# Patient Record
Sex: FEMALE | Race: BLACK OR AFRICAN AMERICAN | Employment: OTHER | URBAN - METROPOLITAN AREA
[De-identification: names, ages, dates, MRNs, and addresses within clinical notes are randomized per-mention and may not be internally consistent; named-entity substitution may affect disease eponyms.]

---

## 2017-01-03 ENCOUNTER — HOSPITAL ENCOUNTER (OUTPATIENT)
Dept: PHYSICAL THERAPY | Age: 77
Discharge: HOME | End: 2017-01-03
Payer: MEDICARE

## 2017-01-03 PROCEDURE — 97112 NEUROMUSCULAR REEDUCATION: CPT

## 2017-01-03 PROCEDURE — G8982 BODY POS GOAL STATUS: HCPCS

## 2017-01-03 PROCEDURE — 97116 GAIT TRAINING THERAPY: CPT

## 2017-01-03 PROCEDURE — G8981 BODY POS CURRENT STATUS: HCPCS

## 2017-01-03 PROCEDURE — 97140 MANUAL THERAPY 1/> REGIONS: CPT

## 2017-01-03 NOTE — PROGRESS NOTES
[facility]              [address]        [place], [address]   [phone] fax    Medicare Progress Report    Patient name: Heather Villalpando Start of Care: 2016   Referral source: Chan Szymanski MD : 1940   Medical/Treatment Diagnosis: Unsteadiness on feet [R26.81]  Dizziness and giddiness [R42] Onset Date:~1 year ago     Prior Hospitalization: see medical history Provider#: [id number]   Medications: Verified on Patient Summary List    Comorbidities: Allergies, Arthritis, Asthma, Back pain, CHF, Depression, HA's, HTN, Prior sx  Prior Level of Function: Pt is (I) with all ADL's, lives alone. Visits from Start of Care: 9    Missed Visits: 0    Reporting Period: 2016 to 1/3/2017    Subjective Reports: Pt reports 60% improvement since [facility]    Key functional changes:   Progress towards goals / Updated goals:  Short term goals: to be accomplished in 2 weeks  1) Pt will report (I) and compliance with HEP for home management of symptoms.    At eval: Instructed, demonstrated, and performed HEP  Current: met, per pt report   2) Pt will improve Rhomberg EO for 30 sec w/o sway to R to decrease risk for falls. At eval: Rhomberg EO sway to R  Current: Met, no sway or touch down today   Long term goals: to be accomplished in 6 weeks  1) Pt will improve FOTO score > or = 65 indicating improvements in function. At eval: FOTO = 59  Current: Goal met- FOTO = 71 (+12 since I.E.)   2) Pt will improve MSR B > or = 30 sec w/o LOB to decrease risk for falls. At eval: MSR LOB after 4 sec  Current: Goal met- B MSR 30 sec w/o LOB   3) Pt will improve Rhomberg EC > or = 15 sec to decrease risk for falls. At eval: LOB after 5 sec with sway to R. Current: Goal met, pt holds 30\" without LOB. 4) Pt will improve C/S AROM all planes WFL w/o an increase in pain/pull for ease with ADL's and driving.   At eval: flex 20 deg with pain, ext 35 deg with pain, B SB 18 deg pull on opposite side of bend, B rot 50 deg with pain on R to L  Current: Progressing, flex 28 (+8), ext 38 (+3), R SB 18 slight pain (no change), L SB 19 (+1) with slight pain, R rot 58 (+8) with pain, L rot 50 (no change). 5) Pt will report > or = 60% improvement in symptoms in order to progress rehab. At eval: 0%  Current: Goal met- pt reports 60% improvement      Pt has progressed well with PT. Pt has demonstrated improvement in standing balance act's, C/S AROM, and overall function. Assessment / Recommendations: Patient will continue to benefit from skilled PT services to modify and progress therapeutic interventions, address functional mobility deficits, address ROM deficits, address strength deficits, analyze and address soft tissue restrictions, analyze and cue movement patterns, assess and modify postural abnormalities, address imbalance/dizziness and instruct in home and community integration to attain remaining goals. Problem List: pain affecting function, decrease ROM, decrease strength, impaired gait/ balance, decrease ADL/ functional abilitiies, decrease activity tolerance and decrease flexibility/ joint mobility   Treatment Plan: Therapeutic exercise, Therapeutic activities, Neuromuscular re-education, Physical agent/modality, Gait/balance training, Manual therapy and Patient education    Updated Goals to be accomplished in 3 weeks: Continue with above unmet goals  1) Pt will demonstrate B tandem stance > or = 30 sec w/o LOB to decrease risk for falls. At PN: LOB after ~10 sec B  2) Pt will report > or = 75% improvement in symptoms in order to progress rehab. At PN: pt reports 60% improvement    Frequency / Duration: Patient to be seen 2 times per week for 3 weeks:    G-Codes (GP)  Position  [id number] Current  CJ= 20-39%   Goal  CJ= 20-39%    The severity rating is based on clinical judgment and the FOTO score.       Jonathan Ag, PT 1/3/2017 10:29 AM

## 2017-01-03 NOTE — PROGRESS NOTES
PT DAILY TREATMENT NOTE - [facility]     Patient Name: Jerry Chavez  Date:1/3/2017  : 1940  [x]  Patient  Verified  [place]or: [place] / Plan: [place] MED[place]ARE PART A & B / Product Type: Medicare /    In time:9:30  Out time:10:20  Total Treatment Time (min): 50  Total Timed Codes (min): 40  1:1 Treatment Time (MC only): 40   Visit #: 9 of 12    Treatment Area: Unsteadiness on feet [R26.81]  Dizziness and giddiness [R42]    SUBJECTIVE  Pain Level (0-10 scale): N-1, D-0  Any medication changes, allergies to medications, adverse drug reactions, diagnosis change, or new procedure performed?: [x] No    [] Yes (see summary sheet for update)  Subjective functional status/changes:   [] No changes reported  Pt reports no current dizziness and minimal neck pain.      OBJECTIVE    Modality rationale: decrease pain and increase tissue extensibility to improve the patients ability to perform ADL's   Min Type Additional Details    [] Estim:  []Unatt       []IFC  []Premod                        []Other:  []w/ice   []w/heat  Position:  Location:    [] Estim: []Att    []TENS instruct  []NMES                    []Other:  []w/US   []w/ice   []w/heat  Position:  Location:    []  Traction: [] Cervical       []Lumbar                       [] Prone          []Supine                       []Intermittent   []Continuous Lbs:  [] before manual  [] after manual    []  Ultrasound: []Continuous   [] Pulsed                           []1MHz   []3MHz W/cm2:  Location:    []  Iontophoresis with dexamethasone         Location: [] Take home patch   [] In clinic   10 []  Ice     [x]  heat  []  Ice massage  []  Laser   []  Anodyne Position: supine  Location: neck    []  Laser with stim  []  Other:  Position:  Location:    []  Vasopneumatic Device Pressure:       [] lo [] med [] hi   Temperature: [] lo [] med [] hi   [] Skin assessment post-treatment:  []intact []redness- no adverse reaction    []redness  adverse reaction:      min []Eval []Re-Eval        min Therapeutic Exercise:  [] See flow sheet :   Rationale:  to improve the patients ability to      min Therapeutic Activity:  []  See flow sheet :   Rationale:   to improve the patients ability to      20 min Neuromuscular Re-education:  [x]  See flow sheet : vestibular ex's, balance ex's   Rationale: improve coordination, improve balance and increase proprioception  to improve the patients ability to decrease risk for falls    10 min Manual Therapy:  Per flow sheet   Rationale: decrease pain, increase ROM, increase tissue extensibility and decrease trigger points to increase ease with ADL's    10 min Gait Training:  Dynamic gait per flow sheet   Rationale: to improve          With   [] TE   [] TA   [] neuro   [] other: Patient Education: [x] Review HEP    [] Progressed/Changed HEP based on:   [] positioning   [] body mechanics   [] transfers   [] heat/ice application    [] other:      Other Objective/Functional Measures: see goals below     Pain Level (0-10 scale) post treatment: N-1, D-0    ASSESSMENT/Changes in Function: cont per POC    Patient will continue to benefit from skilled PT services to modify and progress therapeutic interventions, address functional mobility deficits, address ROM deficits, address strength deficits, analyze and address soft tissue restrictions, analyze and cue movement patterns, assess and modify postural abnormalities, address imbalance/dizziness and instruct in home and community integration to attain remaining goals.      []  See Plan of Care  []  See progress note/recertification  []  See Discharge Summary         Progress towards goals / Updated goals:  Short term goals: to be accomplished in 2 weeks  1) Pt will report (I) and compliance with HEP for home management of symptoms.    At eval: Instructed, demonstrated, and performed HEP  Current: met, per pt report 12/15/16  2) Pt will improve Rhomberg EO for 30 sec w/o sway to R to decrease risk for falls. At eval: Rhomberg EO sway to R  Current: Met, no sway or touch down today 12/15/16  Long term goals: to be accomplished in 6 weeks  1) Pt will improve FOTO score > or = 65 indicating improvements in function. At eval: FOTO = 59  Current: Goal met- FOTO = 71 (+12 since I.E.) (12/20/16)  2) Pt will improve MSR B > or = 30 sec w/o LOB to decrease risk for falls. At eval: MSR LOB after 4 sec  Current: Goal met- B MSR 30 sec w/o LOB (1/3/17)  3) Pt will improve Rhomberg EC > or = 15 sec to decrease risk for falls. At eval: LOB after 5 sec with sway to R. Current: Goal met, pt holds 30\" without LOB. 12/28/16   4) Pt will improve C/S AROM all planes WFL w/o an increase in pain/pull for ease with ADL's and driving. At eval: flex 20 deg with pain, ext 35 deg with pain, B SB 18 deg pull on opposite side of bend, B rot 50 deg with pain on R to L  Current: Progressing, flex 28 (+8), ext 38 (+3), R SB 18 slight pain (no change), L SB 19 (+1) with slight pain, R rot 58 (+8) with pain, L rot 50 (no change). 12/28/16  5) Pt will report > or = 60% improvement in symptoms in order to progress rehab.   At eval: 0%  Current: Goal met- pt reports 60% improvement (1/3/17)    PLAN  [x]  Upgrade activities as tolerated     [x]  Continue plan of care  []  Update interventions per flow sheet       []  Discharge due to:_  []  Other:_      Unique Parks PT 1/3/2017  9:22 AM    Future Appointments  Date Time Provider Alva Pierre   1/3/2017 9:30 AM Unique Parks PT [facility]   1/5/2017 9:30 AM Unique Parks PT [facility]

## 2017-01-05 ENCOUNTER — HOSPITAL ENCOUNTER (OUTPATIENT)
Dept: PHYSICAL THERAPY | Age: 77
Discharge: HOME | End: 2017-01-05
Payer: MEDICARE

## 2017-01-05 PROCEDURE — 97116 GAIT TRAINING THERAPY: CPT

## 2017-01-05 PROCEDURE — G8983 BODY POS D/C STATUS: HCPCS

## 2017-01-05 PROCEDURE — G8982 BODY POS GOAL STATUS: HCPCS

## 2017-01-05 PROCEDURE — 97112 NEUROMUSCULAR REEDUCATION: CPT

## 2017-01-05 PROCEDURE — 97140 MANUAL THERAPY 1/> REGIONS: CPT

## 2017-02-07 NOTE — PROGRESS NOTES
[facility]              [address]        [place], [address] [place]   [phone] fax      Discharge Summary  Patient name: Seng Savage Start of Care: 2016   Referral source: Piyush Zeng MD : 1940   Medical/Treatment Diagnosis: Unsteadiness on feet [R26.81]  Dizziness and giddiness [R42] Onset Date:~1 year ago     Prior Hospitalization: see medical history Provider#: [id number]   Medications: Verified on Patient Summary List    Comorbidities: Allergies, Arthritis, Asthma, Back pain, CHF, Depression, HA's, HTN, Prior sx  Prior Level of Function: Pt is (I) with all ADL's, lives alone. Visits from Start of Care: 10    Missed Visits: 0  Reporting Period : 2016 to 2017      Summary of Care:  Progress towards goals / Updated goals:  Short term goals: to be accomplished in 2 weeks  1) Pt will report (I) and compliance with HEP for home management of symptoms.    At eval: Instructed, demonstrated, and performed HEP  Current: met, per pt report   2) Pt will improve Rhomberg EO for 30 sec w/o sway to R to decrease risk for falls. At eval: Rhomberg EO sway to R  Current: Met, no sway or touch down today   Long term goals: to be accomplished in 6 weeks  1) Pt will improve FOTO score > or = 65 indicating improvements in function. At eval: FOTO = 59  Current: Goal met- FOTO = 71 (+12 since I.E.)   2) Pt will improve MSR B > or = 30 sec w/o LOB to decrease risk for falls. At eval: MSR LOB after 4 sec  Current: Goal met- B MSR 30 sec w/o LOB   3) Pt will improve Rhomberg EC > or = 15 sec to decrease risk for falls. At eval: LOB after 5 sec with sway to R. Current: Goal met, pt holds 30\" without LOB. 4) Pt will improve C/S AROM all planes WFL w/o an increase in pain/pull for ease with ADL's and driving.   At eval: flex 20 deg with pain, ext 35 deg with pain, B SB 18 deg pull on opposite side of bend, B rot 50 deg with pain on R to L  Current: Progressing, flex 28 (+8), ext 38 (+3), R SB 18 slight pain (no change), L SB 19 (+1) with slight pain, R rot 58 (+8) with pain, L rot 50 (no change). 5) Pt will report > or = 60% improvement in symptoms in order to progress rehab. At eval: 0%  Current: Goal met- pt reports 60% improvement       Pt has progressed well with PT. Pt has demonstrated improvement in standing balance act's, C/S AROM, and overall function. Pt was put on hold pending symptoms w/o PT and we never heard back from pt so D/C at this time due to progress toward goals and 30 day policy. G-Codes (GP)  Position   Goal  CJ= 20-39%  [id number] D/C  CJ= 20-39%    The severity rating is based on clinical judgment and the FOTO Score score.     ASSESSMENT/RECOMMENDATIONS:  [x]Discontinue therapy: [x]Patient has reached or is progressing toward set goals      []Patient is non-compliant or has abdicated      []Due to lack of appreciable progress towards set 1324 Reji Pinedo, PT 2/7/2017 2:30 PM

## 2019-01-30 ENCOUNTER — ANESTHESIA EVENT (OUTPATIENT)
Dept: ENDOSCOPY | Age: 79
End: 2019-01-30
Payer: MEDICARE

## 2019-01-30 ENCOUNTER — HOSPITAL ENCOUNTER (OUTPATIENT)
Age: 79
Setting detail: OUTPATIENT SURGERY
Discharge: HOME | End: 2019-01-30
Attending: INTERNAL MEDICINE | Admitting: INTERNAL MEDICINE
Payer: MEDICARE

## 2019-01-30 ENCOUNTER — ANESTHESIA (OUTPATIENT)
Dept: ENDOSCOPY | Age: 79
End: 2019-01-30
Payer: MEDICARE

## 2019-01-30 VITALS
DIASTOLIC BLOOD PRESSURE: 80 MMHG | BODY MASS INDEX: 24.04 KG/M2 | HEIGHT: 64 IN | SYSTOLIC BLOOD PRESSURE: 138 MMHG | TEMPERATURE: 97.6 F | OXYGEN SATURATION: 100 % | HEART RATE: 64 BPM | RESPIRATION RATE: 14 BRPM | WEIGHT: 140.8 LBS

## 2019-01-30 LAB
BUN BLD-MCNC: 10 MG/DL (ref 7–18)
CHLORIDE BLD-SCNC: 102 MMOL/L (ref 100–108)
GLUCOSE BLD STRIP.AUTO-MCNC: 122 MG/DL (ref 74–106)
HCT VFR BLD CALC: 44 % (ref 36–49)
HGB BLD-MCNC: 15 G/DL (ref 12–16)
POTASSIUM BLD-SCNC: 4.1 MMOL/L (ref 3.5–5.5)
SODIUM BLD-SCNC: 139 MMOL/L (ref 136–145)

## 2019-01-30 PROCEDURE — 76040000019: Performed by: INTERNAL MEDICINE

## 2019-01-30 PROCEDURE — 82947 ASSAY GLUCOSE BLOOD QUANT: CPT

## 2019-01-30 PROCEDURE — 77030018846 HC SOL IRR STRL H20 ICUM -A: Performed by: INTERNAL MEDICINE

## 2019-01-30 PROCEDURE — 77030013992 HC SNR POLYP ENDOSC BSC -B: Performed by: INTERNAL MEDICINE

## 2019-01-30 PROCEDURE — 77030009426 HC FCPS BIOP ENDOSC BSC -B: Performed by: INTERNAL MEDICINE

## 2019-01-30 PROCEDURE — 77030008565 HC TBNG SUC IRR ERBE -B: Performed by: INTERNAL MEDICINE

## 2019-01-30 PROCEDURE — 88305 TISSUE EXAM BY PATHOLOGIST: CPT

## 2019-01-30 PROCEDURE — 74011250636 HC RX REV CODE- 250/636

## 2019-01-30 PROCEDURE — 76060000031 HC ANESTHESIA FIRST 0.5 HR: Performed by: INTERNAL MEDICINE

## 2019-01-30 PROCEDURE — 74011250636 HC RX REV CODE- 250/636: Performed by: INTERNAL MEDICINE

## 2019-01-30 RX ORDER — MAGNESIUM SULFATE 100 %
4 CRYSTALS MISCELLANEOUS AS NEEDED
Status: DISCONTINUED | OUTPATIENT
Start: 2019-01-30 | End: 2019-01-30 | Stop reason: HOSPADM

## 2019-01-30 RX ORDER — SODIUM CHLORIDE 0.9 % (FLUSH) 0.9 %
5-40 SYRINGE (ML) INJECTION AS NEEDED
Status: DISCONTINUED | OUTPATIENT
Start: 2019-01-30 | End: 2019-01-30 | Stop reason: HOSPADM

## 2019-01-30 RX ORDER — ONDANSETRON 2 MG/ML
4 INJECTION INTRAMUSCULAR; INTRAVENOUS ONCE
Status: DISCONTINUED | OUTPATIENT
Start: 2019-01-30 | End: 2019-01-30 | Stop reason: HOSPADM

## 2019-01-30 RX ORDER — DEXTROSE 50 % IN WATER (D50W) INTRAVENOUS SYRINGE
25-50 AS NEEDED
Status: DISCONTINUED | OUTPATIENT
Start: 2019-01-30 | End: 2019-01-30 | Stop reason: HOSPADM

## 2019-01-30 RX ORDER — LIDOCAINE HYDROCHLORIDE 20 MG/ML
INJECTION, SOLUTION EPIDURAL; INFILTRATION; INTRACAUDAL; PERINEURAL AS NEEDED
Status: DISCONTINUED | OUTPATIENT
Start: 2019-01-30 | End: 2019-01-30 | Stop reason: HOSPADM

## 2019-01-30 RX ORDER — SODIUM CHLORIDE 0.9 % (FLUSH) 0.9 %
5-40 SYRINGE (ML) INJECTION EVERY 8 HOURS
Status: DISCONTINUED | OUTPATIENT
Start: 2019-01-30 | End: 2019-01-30 | Stop reason: HOSPADM

## 2019-01-30 RX ORDER — SODIUM CHLORIDE, SODIUM LACTATE, POTASSIUM CHLORIDE, CALCIUM CHLORIDE 600; 310; 30; 20 MG/100ML; MG/100ML; MG/100ML; MG/100ML
125 INJECTION, SOLUTION INTRAVENOUS CONTINUOUS
Status: DISCONTINUED | OUTPATIENT
Start: 2019-01-30 | End: 2019-01-30 | Stop reason: HOSPADM

## 2019-01-30 RX ORDER — PROPOFOL 10 MG/ML
INJECTION, EMULSION INTRAVENOUS AS NEEDED
Status: DISCONTINUED | OUTPATIENT
Start: 2019-01-30 | End: 2019-01-30 | Stop reason: HOSPADM

## 2019-01-30 RX ORDER — SODIUM CHLORIDE, SODIUM LACTATE, POTASSIUM CHLORIDE, CALCIUM CHLORIDE 600; 310; 30; 20 MG/100ML; MG/100ML; MG/100ML; MG/100ML
75 INJECTION, SOLUTION INTRAVENOUS CONTINUOUS
Status: DISCONTINUED | OUTPATIENT
Start: 2019-01-30 | End: 2019-01-30 | Stop reason: HOSPADM

## 2019-01-30 RX ORDER — FENTANYL CITRATE 50 UG/ML
50 INJECTION, SOLUTION INTRAMUSCULAR; INTRAVENOUS AS NEEDED
Status: DISCONTINUED | OUTPATIENT
Start: 2019-01-30 | End: 2019-01-30 | Stop reason: HOSPADM

## 2019-01-30 RX ADMIN — PROPOFOL 50 MG: 10 INJECTION, EMULSION INTRAVENOUS at 11:32

## 2019-01-30 RX ADMIN — PROPOFOL 30 MG: 10 INJECTION, EMULSION INTRAVENOUS at 11:38

## 2019-01-30 RX ADMIN — PROPOFOL 40 MG: 10 INJECTION, EMULSION INTRAVENOUS at 11:42

## 2019-01-30 RX ADMIN — LIDOCAINE HYDROCHLORIDE 40 MG: 20 INJECTION, SOLUTION EPIDURAL; INFILTRATION; INTRACAUDAL; PERINEURAL at 11:29

## 2019-01-30 RX ADMIN — SODIUM CHLORIDE, SODIUM LACTATE, POTASSIUM CHLORIDE, AND CALCIUM CHLORIDE 125 ML/HR: 600; 310; 30; 20 INJECTION, SOLUTION INTRAVENOUS at 10:34

## 2019-01-30 NOTE — PERIOP NOTES
Patient confirmed by two identifiers with discharge instructions prior too being provided to patient and son. Patient armband removed and shredded

## 2019-01-30 NOTE — ANESTHESIA POSTPROCEDURE EVALUATION
Procedure(s): 
COLONOSCOPY w polypectomy. Anesthesia Post Evaluation Multimodal analgesia: multimodal analgesia used between 6 hours prior to anesthesia start to PACU discharge Patient location during evaluation: bedside Patient participation: complete - patient participated Level of consciousness: awake Pain management: adequate Airway patency: patent Anesthetic complications: no 
Cardiovascular status: stable Respiratory status: acceptable Hydration status: acceptable Post anesthesia nausea and vomiting:  controlled Visit Vitals /80 (BP 1 Location: Right arm, BP Patient Position: At rest) Pulse 64 Temp 36.4 °C (97.6 °F) Resp 14 Ht 5' 4\" (1.626 m) Wt 63.9 kg (140 lb 12.8 oz) SpO2 100% BMI 24.17 kg/m²

## 2019-01-30 NOTE — PROCEDURES
[facility]  [address], [address]      Brief Procedure Note    Aj Cesar  1940  [id number]    Date of Procedure: 1/30/2019    Preoperative diagnosis: dx    Postoperative diagnosis:  ascending polyp, hemorrhoids    Type of Anesthesia: MAC (monitered anesthesia care)    Description of Findings: same as post op dx    Procedure: Procedure(s):  COLONOSCOPY w polypectomy    :  Dr. Sridhar Garcia MD    Assistant(s): @[facility]@    Type of Anesthesia:MAC     EBL:None    Specimens:   ID Type Source Tests Collected by Time Destination   1 : Ascending polyp Preservative Colon  Jack Worthington MD 1/30/2019 1139 Pathology       Findings: See printed and scanned procedure note    Complications: None    Dr. Sridhar Garcia MD  1/30/2019  11:48 AM

## 2019-01-30 NOTE — H&P
Chief Complaint: CRCS History of present illness: No major complaints. PMH:  
Past Medical History:  
Diagnosis Date  AAA (abdominal aortic aneurysm) ([facility] [address]  Adrenal cyst ([address]  Arthritis  Asthma  Degenerative joint disease (DJD) of lumbar spine  DJD (degenerative joint disease) of cervical spine  BORIS (generalized anxiety disorder)  GERD (gastroesophageal reflux disease)  HLD (hyperlipidemia)  Hypertension  Kidney lesion, native, left  Leaking abdominal aortic aneurysm (AAA) ([facility] [address]  Liver cyst   
 Meniere disease, left  Pancreatic cyst   
 Renal cyst   
 Solitary pulmonary nodule Allergies: Allergies Allergen Reactions  Morphine Itching and Hives Medications:  
Current Facility-Administered Medications:  
  lactated Ringers infusion, 125 mL/hr, IntraVENous, CONTINUOUS, Alex Dean MD, Last Rate: 125 mL/hr at 19 1034, 125 mL/hr at 19 1034 FH:  
Family History Problem Relation Age of Onset  Thyroid Disease Mother  Heart Disease Mother  Lung Cancer Father  Cancer Sister  Lung Cancer Brother Social:  
Social History Socioeconomic History  Marital status:  Spouse name: Not on file  Number of children: Not on file  Years of education: Not on file  Highest education level: Not on file Tobacco Use  Smoking status: Former Smoker Last attempt to quit:  Years since quittin.0  Smokeless tobacco: Never Used Substance and Sexual Activity  Alcohol use: No  
 Drug use: No  
 
Surgical H:  
Past Surgical History:  
Procedure Laterality Date  CARDIAC SURG PROCEDURE UNLIST    
 aaa repair  HX APPENDECTOMY  HX CERVICAL DISKECTOMY  HX CHOLECYSTECTOMY  HX GYN    
 hysterectomy  HX LUMBAR DISKECTOMY  HX ORTHOPAEDIC    
 neck  HX ORTHOPAEDIC    
 back  HX OTHER SURGICAL    
 lypoma ROS: negative Physical Exam: Visit Vitals /79 Pulse 67 Temp 97.5 °F (36.4 °C) Resp 18 Ht 5' 4\" (1.626 m) Wt 63.9 kg (140 lb 12.8 oz) SpO2 94% BMI 24.17 kg/m² General appearance: alert, no distress Eyes: pupils equal and reactive, extraocular eye movements intact Nodes: No gross adenopathy in neck. Skin: no spider angiomata, jaundice, palmar erythema Respiratory: clear to auscultation bilaterally Cardiovascular: regular heart rate, no murmurs, no JVD, normal rate and regular rhythm Abdomen: soft, non-tender, liver not enlarged, spleen not palpable, no obvious ascites Extremities: no muscle wasting, no gross arthritic changes Neurologic: alert and oriented, cranial nerves grossly intact, no asterixis Labs: No results found for this or any previous visit (from the past 24 hour(s)). Imp/ Plan: Will proceed with colonoscopy as planned. Risk benefits alternative including but not limited to infection, bleeding, perforation of viscous, allergic reaction and resultant morbidity and mortality was discussed. Chance of missing a significant lesion due to various reasons were discussed. Stephanie Vides MD 
[facility], [place]

## 2019-01-30 NOTE — ANESTHESIA PREPROCEDURE EVALUATION
Anesthetic History No history of anesthetic complications Review of Systems / Medical History Patient summary reviewed and pertinent labs reviewed Pulmonary Asthma : well controlled Neuro/Psych Within defined limits Cardiovascular Hypertension: well controlled GI/Hepatic/Renal 
  
GERD: well controlled Liver disease Endo/Other Arthritis Other Findings Physical Exam 
 
Airway Mallampati: II 
TM Distance: 4 - 6 cm Neck ROM: normal range of motion Mouth opening: Normal 
 
 Cardiovascular Dental 
No notable dental hx Pulmonary Abdominal 
GI exam deferred Other Findings Anesthetic Plan ASA: 3 Anesthesia type: MAC Anesthetic plan and risks discussed with: Patient

## 2019-07-11 ENCOUNTER — OFFICE VISIT (OUTPATIENT)
Dept: ORTHOPEDIC SURGERY | Age: 79
End: 2019-07-11

## 2019-07-11 ENCOUNTER — DOCUMENTATION ONLY (OUTPATIENT)
Dept: ORTHOPEDIC SURGERY | Age: 79
End: 2019-07-11

## 2019-07-11 VITALS
TEMPERATURE: 98.3 F | HEART RATE: 73 BPM | SYSTOLIC BLOOD PRESSURE: 143 MMHG | OXYGEN SATURATION: 99 % | HEIGHT: 64 IN | DIASTOLIC BLOOD PRESSURE: 93 MMHG | BODY MASS INDEX: 24.11 KG/M2 | WEIGHT: 141.2 LBS

## 2019-07-11 DIAGNOSIS — M50.30 DDD (DEGENERATIVE DISC DISEASE), CERVICAL: ICD-10-CM

## 2019-07-11 DIAGNOSIS — R03.0 ELEVATED BLOOD PRESSURE READING: ICD-10-CM

## 2019-07-11 DIAGNOSIS — M54.5 LOW BACK PAIN, UNSPECIFIED BACK PAIN LATERALITY, UNSPECIFIED CHRONICITY, WITH SCIATICA PRESENCE UNSPECIFIED: Primary | ICD-10-CM

## 2019-07-11 DIAGNOSIS — R29.898 LEFT LEG WEAKNESS: ICD-10-CM

## 2019-07-11 DIAGNOSIS — M47.816 LUMBAR FACET ARTHROPATHY: ICD-10-CM

## 2019-07-11 DIAGNOSIS — M62.838 MUSCLE SPASM: ICD-10-CM

## 2019-07-11 DIAGNOSIS — M54.2 NECK PAIN: ICD-10-CM

## 2019-07-11 DIAGNOSIS — Z86.79 STATUS POST REPAIR OF ABDOMINAL AORTIC ANEURYSM (AAA) USING BIFURCATION GRAFT: ICD-10-CM

## 2019-07-11 DIAGNOSIS — M47.812 CERVICAL FACET JOINT SYNDROME: ICD-10-CM

## 2019-07-11 DIAGNOSIS — M48.062 SPINAL STENOSIS OF LUMBAR REGION WITH NEUROGENIC CLAUDICATION: ICD-10-CM

## 2019-07-11 DIAGNOSIS — M79.18 MYOFASCIAL PAIN: ICD-10-CM

## 2019-07-11 DIAGNOSIS — Z95.828 STATUS POST REPAIR OF ABDOMINAL AORTIC ANEURYSM (AAA) USING BIFURCATION GRAFT: ICD-10-CM

## 2019-07-11 NOTE — PROGRESS NOTES
[facility]  [address] [place], [facility] [address]  Phone: [phone]  Fax: [phone]    NEW PATIENT  Pt's YOB: 1940    ASSESSMENT   Diagnoses and all orders for this visit:    1. Low back pain, unspecified back pain laterality, unspecified chronicity, with sciatica presence unspecified  -     XR SPINE LUMB MIN 4 V; Future  -     MRI LUMB SPINE W WO CONT; Future    2. Lumbar facet arthropathy  -     MRI LUMB SPINE W WO CONT; Future    3. Spinal stenosis of lumbar region with neurogenic claudication  -     MRI LUMB SPINE W WO CONT; Future    4. Left leg weakness  -     MRI LUMB SPINE W WO CONT; Future    5. Muscle spasm    6. Neck pain  -     XR SPINE CERV FLEX/EXT MAX 3 V; Future    7. Elevated blood pressure reading    8. DDD (degenerative disc disease), cervical    9. Cervical facet joint syndrome    10. Myofascial pain    11. Status post repair of abdominal aortic aneurysm (AAA) using bifurcation graft         IMPRESSION AND PLAN:  Eb Moss is a 77 y.o. female with history of neck and low back pain x 1 year. She complains of pain in the lower back with pain and weakness in both legs. Pt notes increased pain since starting physical therapy. She also complains of neck pain, numbness in the hands/fingers, weakness in the hands, and notes to difficulty opening jars. Pt has been prescribed Neurontin 300 mg and takes 1 tab QHS. 1) Pt was given information on cervical and lumbar arthritis exercises. 2) A lumbar MRI was ordered. She has lumbar pain with bilateral radicular symptoms, left leg weakness, difficulty walking, and has attended physical therapy. 3) Pt was given information on how to use a TheraCane. 4) She will discontinue physical therapy since she reports increased pain with sessions. 5) Ms. Ericka Jones has a reminder for a \"due or due soon\" health maintenance.  I have asked that she contact her primary care provider, Damaris Smith MD, for follow-up on this health maintenance. 6)  demonstrated consistency with prescribing. Follow-up and Dispositions    · Return in about 2 weeks (around 7/25/2019) for Diagnostic Test follow up. HISTORY OF PRESENT ILLNESS:  Luz Maria Dodge is a 77 y.o. female with history of neck and low back pain x 1 year. Pt presents to the office today as a new patient referred by Damaris Smith MD. She complains of pain in the lower back with pain and weakness in both legs. Pt notes increased pain since starting physical therapy. She states that prior to starting land physical therapy her symptoms were mostly right sided but now she also has left sided symptoms. Pt admits to issues with balance and notes that she occasionally has difficulty picking up her feet. Of note, she had 2 previous lumbar surgeries. Her first surgery was with Dr. Esther Donald and her second surgery was with Dr. Limon. She also complains of neck pain and reports a previous neck surgery with Dr. Freddie Brock. Pt admits to numbness in the hands/fingers, weakness in the hands, and notes to difficulty opening jars. Pt has been prescribed Neurontin 300 mg and takes 1 tab QHS. Pt notes sedation upon waking when she tried taking Neurontin 300 mg 2 tabs QHS. She also takes Tylenol Extra Strength as needed. Of note, she had previous vascular surgery with Dr. Juan Day. 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 Pt at this time desires to proceed with a lumbar MRI. Pt's daughter accompanied the patient today and notes that her mother likes to garden. Her daughter reports that she often spends half a day working in her garden.      Pain Scale: 6/10     PCP: Damaris Smith MD    Past Medical History:   Diagnosis Date    AAA (abdominal aortic aneurysm) ([facility] [address]     Adrenal cyst (HCC)     Arthritis     Asthma     Degenerative joint disease (DJD) of lumbar spine     DJD (degenerative joint disease) of cervical spine     BORIS (generalized anxiety disorder)     GERD (gastroesophageal reflux disease)     HLD (hyperlipidemia)     Hypertension     Kidney lesion, native, left     Leaking abdominal aortic aneurysm (AAA) (HCC)     Liver cyst     Meniere disease, left     Pancreatic cyst     Renal cyst     Solitary pulmonary nodule         Social History     Socioeconomic History    Marital status:      Spouse name: Not on file    Number of children: Not on file    Years of education: Not on file    Highest education level: Not on file   Occupational History    Not on file   Social Needs    Financial resource strain: Not on file    Food insecurity:     Worry: Not on file     Inability: Not on file    Transportation needs:     Medical: Not on file     Non-medical: Not on file   Tobacco Use    Smoking status: Former Smoker     Last attempt to quit:      Years since quittin.5    Smokeless tobacco: Never Used   Substance and Sexual Activity    Alcohol use: No    Drug use: No    Sexual activity: Not on file   Lifestyle    Physical activity:     Days per week: Not on file     Minutes per session: Not on file    Stress: Not on file   Relationships    Social connections:     Talks on phone: Not on file     Gets together: Not on file     Attends Pentecostal service: Not on file     Active member of club or organization: Not on file     Attends meetings of clubs or organizations: Not on file     Relationship status: Not on file    Intimate partner violence:     Fear of current or ex partner: Not on file     Emotionally abused: Not on file     Physically abused: Not on file     Forced sexual activity: Not on file   Other Topics Concern    Not on file   Social History Narrative    Not on file       Current Outpatient Medications   Medication Sig Dispense Refill    folic acid/multivit-min/lutein (CENTRUM SILVER PO) Take 1 Tab by Mouth Once a Day.       loratadine (CLARITIN) 10 mg tablet 10 mg.      omeprazole (PRILOSEC) 20 mg capsule 20 mg.      acetaminophen (TYLENOL) 500 mg tablet 1,000 mg.  calcium-vitamin D 600 mg(1,500mg) -200 unit tab Take 1 Tab by Mouth Twice Daily.  fluticasone (FLONASE) 50 mcg/actuation nasal spray 2 Sprays.  carvedilol (COREG) 12.5 mg tablet Take  by mouth two (2) times daily (with meals).  furosemide (LASIX) 20 mg tablet Take  by mouth daily.  losartan (COZAAR) 50 mg tablet Take  by mouth daily.  lovastatin (MEVACOR) 40 mg tablet Take 40 mg by mouth nightly.  timolol (TIMOPTIC) 0.5 % ophthalmic solution 1 Drop two (2) times a day.  gabapentin (NEURONTIN) 300 mg capsule Take 600 mg by mouth two (2) times a day.  aspirin 81 mg tablet Take 81 mg by mouth.  aluminum & magnesium hydroxide-simethicone (MYLANTA II) 400-400-40 mg/5 mL suspension 30 mL.  nitroglycerin (NITROSTAT) 0.4 mg SL tablet 0.4 mg.      umeclidinium-vilanterol (ANORO ELLIPTA) 62.5-25 mcg/actuation inhaler Take 1 Puff by inhalation daily. Allergies   Allergen Reactions    Morphine Itching and Hives       REVIEW OF SYSTEMS    Constitutional: Negative for fever, chills, or weight change. Respiratory: Negative for cough or shortness of breath. Cardiovascular: Negative for chest pain or palpitations. Gastrointestinal: Negative for acid reflux, change in bowel habits, or constipation. Genitourinary: Negative for dysuria and flank pain. Musculoskeletal: Positive for cervical and lumbar pain. Skin: Negative for rash. Neurological: Negative for headaches or dizziness. Positive for numbness in the hands. Endo/Heme/Allergies: Negative for increased bruising. Psychiatric/Behavioral: Negative for difficulty with sleep.     PHYSICAL EXAMINATION  Visit Vitals  BP (!) 143/93 (BP 1 Location: Left arm, BP Patient Position: Sitting)   Pulse 73   Temp 98.3 °F (36.8 °C) (Oral)   Ht 5' 4\" (1.626 m)   Wt 141 lb 3.2 oz (64 kg)   SpO2 99%   BMI 24.24 kg/m²       Constitutional: Awake, alert, and in no acute distress. HEENT: Normocephalic. Atraumatic. Oropharynx is moist and clear. PERRL. EOMI. Sclerae are nonicteric  Heart: Regular rate and rhythm  Lungs: Clear to auscultation bilaterally  Abdomen: Soft and nontender. Bowel sounds are present  Neurological: 1+ symmetrical DTRs in the upper extremities. 1+ symmetrical DTRs in the lower extremities. Sensation to light touch is intact. Negative Lopez's sign bilaterally. Skin: warm, dry, and intact. Musculoskeletal: Very tight across the upper trapezius with scattered trigger points. Good range of motion in both shoulders. Tenderness to palpation in the lower lumbar region. Moderate pain with extension and axial loading. Improvement with forward flexion. No pain with internal or external rotation of her hips. Negative straight leg raise bilaterally. Biceps  Triceps Deltoids Wrist Ext Wrist Flex Hand Intrin   Right +4/5 +4/5 +4/5 +4/5 +4/5 +4/5   Left +4/5 +4/5 +4/5 +4/5 +4/5 +4/5      Hip Flex  Quads Hamstrings Ankle DF EHL Ankle PF   Right +4/5 +4/5 +4/5 +4/5 +4/5 +4/5   Left  4/5  4/5 +4/5 +4/5 +4/5 +4/5     IMAGING:    Cervical spine x-rays from 07/11/2019 [facility]) were personally reviewed with the patient and demonstrated:  Bony fusion at C5-6. Multilevel degenerative facets. Anterior osteophyte formation at C6-7. Straightening of the cervical spine. Lumbar spine x-rays from 07/11/2019 [facility]) were personally reviewed with the patient and demonstrated:  Degenerative disc at L3-4. Multilevel degenerative facets. Grade 1 to 2 listhesis at L4-5. Wedge deformity at T12. Abdominal CT from 08/25/2018 was personally reviewed with the patient and demonstrated:  FINDINGS:    LOWER CHEST: Stable aneurysmal dilatation of the descending thoracic aorta. LIVER, BILIARY: Stable hepatic cysts. Pneumobilia present on prior CT is not present today. No biliary dilation. Prior cholecystectomy.     PANCREAS: The multiloculated cystic mass in the tail of the pancreas is subtle on this noncontrast exam. Overall length of involvement is 5.8 cm which is unchanged when compared to exams dating back to 9/7/2011. Head neck and body are unremarkable with no ductal dilatation. SPLEEN: Unremarkable. ADRENALS: Left adrenal gland mass is stable also dating back to 2011. It measures 2.8 x 1.8 cm. Right adrenal gland is normal.    KIDNEYS/URETERS/BLADDER: Unremarkable. PELVIC ORGANS: Prior hysterectomy. LYMPH NODES: No enlarged lymph nodes. GASTROINTESTINAL TRACT: No bowel dilation or wall thickening. No evidence of bowel obstruction. Uncomplicated colonic diverticulosis. VASCULATURE: Stable size of abdominal aortic aneurysm status post stent graft repair. BONES: No acute or aggressive osseous abnormalities identified. Stable grade 2 spondylolisthesis L4 on L5.    OTHER: None. IMPRESSION:  1. The multiloculated cystic mass in the tail of the pancreas is unchanged dating back to 2011 indicating benign etiology. 2. Left adrenal gland mass is also stable dating back to 2011 indicating benign etiology. 3. Stable appearance of lower thoracic and abdominal aortic aneurysm with stent graft repair of the abdominal aortic aneurysm. 4. Uncomplicated colonic diverticulosis. 5. Stable hepatic cysts. Written by Jacquelyn Evans, as dictated by Barrett Boyd MD.  I, Dr. Barrett Boyd confirm that all documentation is accurate.

## 2019-07-11 NOTE — PATIENT INSTRUCTIONS

## 2019-07-11 NOTE — PROGRESS NOTES
MRI Lumbar Spine with and without contrast is scheduled at [phone], [phone], on 07/26/19, arrive 10:15Am, test 10:45Am. No Medicare pre-authorization required.

## 2019-07-11 NOTE — LETTER
7/15/19 Patient: Janelle Gallagher YOB: 1940 Date of Visit: 7/11/2019 Kiara Carrizales MD 
[address] [place] [address] VIA Facsimile: [phone] Dear Kiara Carrizales MD, Thank you for referring Ms. Lulu Saini to [facility] for evaluation. My notes for this consultation are attached. If you have questions, please do not hesitate to call me. I look forward to following your patient along with you. Sincerely, Rena Gramajo MD

## 2019-08-08 ENCOUNTER — OFFICE VISIT (OUTPATIENT)
Dept: ORTHOPEDIC SURGERY | Age: 79
End: 2019-08-08

## 2019-08-08 VITALS
BODY MASS INDEX: 24.34 KG/M2 | RESPIRATION RATE: 16 BRPM | TEMPERATURE: 98.9 F | HEART RATE: 74 BPM | DIASTOLIC BLOOD PRESSURE: 78 MMHG | WEIGHT: 142.6 LBS | HEIGHT: 64 IN | SYSTOLIC BLOOD PRESSURE: 120 MMHG | OXYGEN SATURATION: 99 %

## 2019-08-08 DIAGNOSIS — R26.9 GAIT ABNORMALITY: ICD-10-CM

## 2019-08-08 DIAGNOSIS — M48.062 SPINAL STENOSIS OF LUMBAR REGION WITH NEUROGENIC CLAUDICATION: Primary | ICD-10-CM

## 2019-08-08 DIAGNOSIS — M47.816 LUMBAR FACET ARTHROPATHY: ICD-10-CM

## 2019-08-08 DIAGNOSIS — G95.89 INTRADURAL MASS (HCC): ICD-10-CM

## 2019-08-08 NOTE — PROGRESS NOTES
[facility]  [address], [address]  Phone: [phone]  Fax: [phone]    Pt's YOB: 1940    ASSESSMENT   Diagnoses and all orders for this visit:    1. Spinal stenosis of lumbar region with neurogenic claudication  -     REFERRAL TO NEUROSURGERY    2. Intradural mass (HCC)  -     REFERRAL TO NEUROSURGERY    3. Lumbar facet arthropathy  -     REFERRAL TO NEUROSURGERY    4. Gait abnormality  -     REFERRAL TO NEUROSURGERY  -     AMB SUPPLY ORDER         IMPRESSION AND PLAN:  Maine Zee is a [de-identified] y.o. female with history of cervical and lumbar pain. She complains of pain in the lower back with pain and weakness in both legs. Pt states she attended physical therapy for 3 weeks but this caused increased neck pain with no improvement in her lower back pain. 1) Pt was given information on lumbar arthritis exercises. 2) She was referred to Dr. Liz Miller. 3) She was prescribed a walker with a seat. 4) Ms. Sun De La Cruz has a reminder for a \"due or due soon\" health maintenance. I have asked that she contact her primary care provider, Melia Babinski, MD, for follow-up on this health maintenance. 5)  demonstrated consistency with prescribing. Follow-up and Dispositions    · Return in about 6 weeks (around 9/19/2019) for surgical evaluation follow up. HISTORY OF PRESENT ILLNESS:  Maine Zee is a [de-identified] y.o. female with history of cervical and lumbar pain and presents to the office today for lumbar MRI follow up. She complains of pain in the lower back with pain and weakness in both legs. She reports worsening weakness in the entire leg when standing and walking for an extended period of time. Of note, she had 2 previous lumbar surgeries. Her first surgery was with Dr. Leanne Cordero and her second surgery was with Dr. Limon.   She also complains of neck pain and reports a previous neck surgery with Dr. Nicho Paige in . Pt notes that she has a walker at home from her last lumbar surgery but notes that she does not use it. Pt states she attended physical therapy for 3 weeks but this caused increased neck pain with no improvement in her lower back pain. She was previously followed by Dr. David Valle and she did not recommend surgery at that time. Pt at this time desires to proceed with a referral to Dr. Leilani Potts.     Pain Scale: 5/10    PCP: Tiffanie Elizalde MD     Past Medical History:   Diagnosis Date    AAA (abdominal aortic aneurysm) (HCC)     Adrenal cyst (HCC)     Arthritis     Asthma     Degenerative joint disease (DJD) of lumbar spine     DJD (degenerative joint disease) of cervical spine     BORIS (generalized anxiety disorder)     GERD (gastroesophageal reflux disease)     HLD (hyperlipidemia)     Hypertension     Kidney lesion, native, left     Leaking abdominal aortic aneurysm (AAA) (HCC)     Liver cyst     Meniere disease, left     Pancreatic cyst     Renal cyst     Solitary pulmonary nodule         Social History     Socioeconomic History    Marital status:      Spouse name: Not on file    Number of children: Not on file    Years of education: Not on file    Highest education level: Not on file   Occupational History    Not on file   Social Needs    Financial resource strain: Not on file    Food insecurity:     Worry: Not on file     Inability: Not on file    Transportation needs:     Medical: Not on file     Non-medical: Not on file   Tobacco Use    Smoking status: Former Smoker     Last attempt to quit:      Years since quittin.6    Smokeless tobacco: Never Used   Substance and Sexual Activity    Alcohol use: No    Drug use: No    Sexual activity: Not on file   Lifestyle    Physical activity:     Days per week: Not on file     Minutes per session: Not on file    Stress: Not on file   Relationships    Social connections:     Talks on phone: Not on file     Gets together: Not on file     Attends Gnosticist service: Not on file     Active member of club or organization: Not on file     Attends meetings of clubs or organizations: Not on file     Relationship status: Not on file    Intimate partner violence:     Fear of current or ex partner: Not on file     Emotionally abused: Not on file     Physically abused: Not on file     Forced sexual activity: Not on file   Other Topics Concern    Not on file   Social History Narrative    Not on file       Current Outpatient Medications   Medication Sig Dispense Refill    folic acid/multivit-min/lutein (CENTRUM SILVER PO) Take 1 Tab by Mouth Once a Day.  loratadine (CLARITIN) 10 mg tablet 10 mg.      aluminum & magnesium hydroxide-simethicone (MYLANTA II) 400-400-40 mg/5 mL suspension 30 mL.  nitroglycerin (NITROSTAT) 0.4 mg SL tablet 0.4 mg.      omeprazole (PRILOSEC) 20 mg capsule 20 mg.      umeclidinium-vilanterol (ANORO ELLIPTA) 62.5-25 mcg/actuation inhaler Take 1 Puff by inhalation daily.  acetaminophen (TYLENOL) 500 mg tablet 1,000 mg.  calcium-vitamin D 600 mg(1,500mg) -200 unit tab Take 1 Tab by Mouth Twice Daily.  fluticasone (FLONASE) 50 mcg/actuation nasal spray 2 Sprays.  carvedilol (COREG) 12.5 mg tablet Take  by mouth two (2) times daily (with meals).  furosemide (LASIX) 20 mg tablet Take  by mouth daily.  losartan (COZAAR) 50 mg tablet Take  by mouth daily.  lovastatin (MEVACOR) 40 mg tablet Take 40 mg by mouth nightly.  timolol (TIMOPTIC) 0.5 % ophthalmic solution 1 Drop two (2) times a day.  gabapentin (NEURONTIN) 300 mg capsule Take 600 mg by mouth two (2) times a day.  aspirin 81 mg tablet Take 81 mg by mouth. Allergies   Allergen Reactions    Morphine Itching and Hives         REVIEW OF SYSTEMS    Constitutional: Negative for fever, chills, or weight change. Respiratory: Negative for cough or shortness of breath.      Cardiovascular: Negative for chest pain or palpitations. Gastrointestinal: Negative for acid reflux, change in bowel habits, or constipation. Genitourinary: Negative for dysuria and flank pain. Musculoskeletal: Positive for lumbar pain and leg weakness. Skin: Negative for rash. Neurological: Negative for headaches, dizziness, or numbness. Endo/Heme/Allergies: Negative for increased bruising. Psychiatric/Behavioral: Negative for difficulty with sleep. PHYSICAL EXAMINATION  Visit Vitals  /78 (BP 1 Location: Left arm, BP Patient Position: Sitting)   Pulse 74   Temp 98.9 °F (37.2 °C) (Oral)   Resp 16   Ht 5' 4\" (1.626 m)   Wt 142 lb 9.6 oz (64.7 kg)   SpO2 99%   BMI 24.48 kg/m²       Constitutional: Awake, alert, and in no acute distress. Neurological: 1+ symmetrical DTRs in the upper extremities. 1+ symmetrical DTRs in the lower extremities. Sensation to light touch is intact. Negative Lopez's sign bilaterally. Skin: warm, dry, and intact. Musculoskeletal: Tenderness to palpation in the lower lumbar region. Moderate pain with extension and axial loading. Improvement with forward flexion. No pain with internal or external rotation of her hips. Negative straight leg raise bilaterally. Biceps  Triceps Deltoids Wrist Ext Wrist Flex Hand Intrin   Right +4/5 +4/5 +4/5 +4/5 +4/5 +4/5   Left +4/5 +4/5 +4/5 +4/5 +4/5 +4/5      Hip Flex  Quads Hamstrings Ankle DF EHL Ankle PF   Right +4/5 +4/5 +4/5 +4/5 +4/5 +4/5   Left +4/5 +4/5 +4/5 +4/5 +4/5 +4/5     IMAGING:    Lumbar spine MRI from 07/28/2019 was personally reviewed with the patient and demonstrated:  Findings: Again noted is a spondylolisthesis secondary to facet arthropathy at L4-L5 with evidence of prior surgery with laminectomy changes. There is degenerative disc disease with disc desiccation at several levels. There is no destructive bony lesion.  There is an intradural masslike lesion which is lobulated and high signal T1 precontrast and mass effect compresses with fat suppression measuring 14 mm in the central aspect of the thecal sac likely exophytic from the cauda equina at the L3 level that suppresses with fat suppression, typical for intradural lipoma. The tip of the conus extends to the L2-L3 disc space level and actually just below and extends to this small lipoma. Axial scans show:    T12-L1: Degenerative changes but no high-grade stenosis. Right lobe liver cyst measuring 2.6 cm appears simple in appearance. L1-L2: Mild degenerative change. No significant stenosis. Additional liver and right renal simple cysts. Small left renal simple cysts. L2/L3: Normal except for mild degenerative change. The tip of the conus extends just below the disc space level and extends to the intradural fat-containing mass which is focal and exophytic anteriorly from the distal conus/proximal filum and measures 8 mm in AP dimension, typical intradural lipoma. There is no protrusion or extrusion and no significant stenosis at this level. L3/L4: Broad-based bulging disc. At the disc space level there is some central clumping of the nerve roots of the cauda equina just below the lipoma and focally prominent dorsal subarachnoid space within the thecal sac. There is substantial central stenosis at the disc space level. Thecal sac measures 7 mm in AP dimension with limited fluid signal surrounding the nerve roots of the cauda equina. L4/L5: Laminectomy changes. Diffusely bulging disc/pseudodisc. This is the level of the spondylolisthesis. Some thickening and clumping of nerve roots of the cauda equina with peripheral distribution of nerve roots of the cauda equina at the L5 level. Mild-moderate thecal sac distortion but no high-grade central stenosis. No significant foraminal stenosis. Upper right lateral recess stenosis could conceivably affect the right L5 root. No high-grade left lateral recess stenosis affecting the left L5 root.     L5/S1: Laminectomy extends down to this level. There is diffuse spondylosis/bulging disc. Relatively severe peripheral right-sided foraminal stenosis could affect the exiting right L5 root. Only mild left-sided foraminal stenosis. There is fatty infiltration of the caudal filum terminale which is not masslike. There is no significant central or lateral recess stenosis at this level. The more rostral discs are included on parasagittal scans up to T10-T11. No additional herniation or stenosis is seen. The inferior most conus medullaris is unremarkable. IMPRESSION    1. 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 This is unchanged from previous imaging although quite subtle on previous CT but was fatty density in retrospect. 2. The conus medullaris is low-lying extending to just below the L2-L3 disc space level and appears to extend to the lipoma. In addition the caudal filum is somewhat thickened and fatty infiltrated. Cannot exclude tethering of the conus. 3. Approaching severe central stenosis at L3-L4. 4. Laminectomy changes at L4-L5 with persistent spondylolisthesis at this level secondary to facet arthropathy. Some thickening of nerve roots of the cauda equina and peripheral distribution of nerve roots consistent with an element of arachnoiditis at and below the surgical level. No recurrent or residual high-grade central or foraminal stenosis at this level with an element of right-sided lateral recess stenosis which could potentially affect the right L5 root. 5. Additional degenerative change but no additional evidence of herniation or high-grade stenosis. Lumbar spine x-rays from 07/11/2019 was personally reviewed with the patient and demonstrated:  IMPRESSION:  1. No evidence for fracture. 2. Multilevel spinal degenerative changes with stable garde 1 anterolisthesis of L4 on L5.     Cervical spine x-rays from 07/11/2019 was personally reviewed with the patient and demonstrated:  IMPRESSION:   1. Multilevel spinal degenerative changes with anterior listhesis at C3 on C4 reduced with extension. Written by Brad Foss, as dictated by Elizabeth Webb MD.  I, Dr. Elizabeth Webb confirm that all documentation is accurate.

## 2019-08-08 NOTE — PATIENT INSTRUCTIONS
Low Back Arthritis: Exercises Introduction Here are some examples of typical rehabilitation exercises for your condition. Start each exercise slowly. Ease off the exercise if you start to have pain. Your doctor or physical therapist will tell you when you can start these exercises and which ones will work best for you. When you are not being active, find a comfortable position for rest. Some people are comfortable on the floor or a medium-firm bed with a small pillow under their head and another under their knees. Some people prefer to lie on their side with a pillow between their knees. Don't stay in one position for too long. Take short walks (10 to 20 minutes) every 2 to 3 hours. Avoid slopes, hills, and stairs until you feel better. Walk only distances you can manage without pain, especially leg pain. How to do the exercises Pelvic tilt 1. Lie on your back with your knees bent. 2. \"Brace\" your stomachtighten your muscles by pulling in and imagining your belly button moving toward your spine. 3. Press your lower back into the floor. You should feel your hips and pelvis rock back. 4. Hold for 6 seconds while breathing smoothly. 5. Relax and allow your pelvis and hips to rock forward. 6. Repeat 8 to 12 times. Back stretches 1. Get down on your hands and knees on the floor. 2. Relax your head and allow it to droop. Round your back up toward the ceiling until you feel a nice stretch in your upper, middle, and lower back. Hold this stretch for as long as it feels comfortable, or about 15 to 30 seconds. 3. Return to the starting position with a flat back while you are on your hands and knees. 4. Let your back sway by pressing your stomach toward the floor. Lift your buttocks toward the ceiling. 5. Hold this position for 15 to 30 seconds. 6. Repeat 2 to 4 times. Follow-up care is a key part of your treatment and safety.  Be sure to make and go to all appointments, and call your doctor if you are having problems. It's also a good idea to know your test results and keep a list of the medicines you take. Where can you learn more? Go to http://bebeto-nancy.info/. Enter Z799 in the search box to learn more about \"Low Back Arthritis: Exercises. \" Current as of: September 20, 2018 Content Version: 12.1 © 5011-3862 [employer]. Care instructions adapted under license by [facility] (which disclaims liability or warranty for this information). If you have questions about a medical condition or this instruction, always ask your healthcare professional. [address] any warranty or liability for your use of this information.

## 2019-08-08 NOTE — LETTER
8/11/19 Patient: Jennifer Feldman YOB: 1940 Date of Visit: 8/8/2019 Leslie Madrigal MD 
[address] [id number] VIA Facsimile: [phone] Dear Leslie Madrigal MD, Thank you for referring Ms. Melvin Bhat to [facility] for evaluation. My notes for this consultation are attached. If you have questions, please do not hesitate to call me. I look forward to following your patient along with you. Sincerely, Kim Schulz MD

## 2019-08-20 DIAGNOSIS — M48.062 SPINAL STENOSIS OF LUMBAR REGION WITH NEUROGENIC CLAUDICATION: ICD-10-CM

## 2019-08-20 DIAGNOSIS — M54.5 LOW BACK PAIN, UNSPECIFIED BACK PAIN LATERALITY, UNSPECIFIED CHRONICITY, WITH SCIATICA PRESENCE UNSPECIFIED: ICD-10-CM

## 2019-08-20 DIAGNOSIS — M47.816 LUMBAR FACET ARTHROPATHY: ICD-10-CM

## 2019-08-20 DIAGNOSIS — M54.2 NECK PAIN: ICD-10-CM

## 2019-08-20 DIAGNOSIS — R29.898 LEFT LEG WEAKNESS: ICD-10-CM

## 2019-09-19 ENCOUNTER — OFFICE VISIT (OUTPATIENT)
Dept: ORTHOPEDIC SURGERY | Age: 79
End: 2019-09-19

## 2019-09-19 VITALS
BODY MASS INDEX: 25.23 KG/M2 | DIASTOLIC BLOOD PRESSURE: 90 MMHG | HEIGHT: 64 IN | SYSTOLIC BLOOD PRESSURE: 130 MMHG | HEART RATE: 79 BPM | OXYGEN SATURATION: 100 % | WEIGHT: 147.8 LBS | TEMPERATURE: 98.8 F

## 2019-09-19 DIAGNOSIS — R26.9 GAIT ABNORMALITY: ICD-10-CM

## 2019-09-19 DIAGNOSIS — M62.838 MUSCLE SPASM: ICD-10-CM

## 2019-09-19 DIAGNOSIS — M48.062 SPINAL STENOSIS OF LUMBAR REGION WITH NEUROGENIC CLAUDICATION: Primary | ICD-10-CM

## 2019-09-19 DIAGNOSIS — M47.816 LUMBAR FACET ARTHROPATHY: ICD-10-CM

## 2019-09-19 RX ORDER — GABAPENTIN 300 MG/1
300 CAPSULE ORAL
Qty: 90 CAP | Refills: 1 | Status: SHIPPED | OUTPATIENT
Start: 2019-09-19

## 2019-09-19 NOTE — PROGRESS NOTES
[facility]  [address], [facility] [address]  Phone: [phone]  Fax: [phone]    Pt's YOB: 1940    ASSESSMENT   Diagnoses and all orders for this visit:    1. Spinal stenosis of lumbar region with neurogenic claudication  -     gabapentin (NEURONTIN) 300 mg capsule; Take 1 Cap by mouth nightly. Max Daily Amount: 300 mg.    2. Lumbar facet arthropathy    3. Muscle spasm    4. Gait abnormality         IMPRESSION AND PLAN:  Miguel Evans is a [de-identified] y.o. female with history of cervical and lumbar pain. She complains of pain in the lower back with cramping pain in the calves that extends into both feet at night. Pt followed up with Dr. Leroy Crocker since her last office visit and patient notes that she did not recommend surgery at that time. She takes Tylenol as needed and notes that she ran out of Neurontin 300 mg 1 tab QHS since her last office visit. 1) Pt was given information on cervical and lumbar arthritis exercises. 2) I recommended the Pt try water exercise, eugenio chi, and yoga. 3) She was instructed to avoid any activity that increased her fall risk. 4) Pt received a refill of Neurontin 300 mg 1 tab QHS. 5) I recommended the patient try Theraworx if needed. 6) Ms. Maribel Benton has a reminder for a \"due or due soon\" health maintenance. I have asked that she contact her primary care provider, Deveron Closs, MD, for follow-up on this health maintenance. 7)  demonstrated consistency with prescribing. Follow-up and Dispositions    · Return if symptoms worsen or fail to improve. HISTORY OF PRESENT ILLNESS:  Miguel Evans is a [de-identified] y.o. female with history of cervical and lumbar pain and presents to the office today for follow up. She complains of pain in the lower back with cramping pain in the calves that extends into both feet at night. Her symptoms worsen with increased activity.  Pt notes that she no longer trims her bushes but admits that she does like to work in her garden. She was prescribed a rollator at her last office visit but admits that she has not yet filled the prescription. Pt followed up with Dr. Donta Ga since her last office visit and patient notes that she did not recommend surgery at that time. She takes Tylenol as needed with benefit. Pt has been prescribed Neurontin 300 mg 1 tab QHS but notes that she has run out of the medication. She notes that she may obtain a refill of the Neurontin from her PCP, Sally Vasquez MD. Pt at this time desires to continue with current care. She presents to the office today with her son, who is a retired .     Pain Scale: 5/10    PCP: Sally Vasquez MD     Past Medical History:   Diagnosis Date    AAA [facility]abdominal aortic aneurysm) ([facility] [address]     Adrenal cyst (HCC)     Arthritis     Asthma     Degenerative joint disease (DJD) of lumbar spine     DJD (degenerative joint disease) of cervical spine     BORIS (generalized anxiety disorder)     GERD (gastroesophageal reflux disease)     HLD (hyperlipidemia)     Hypertension     Kidney lesion, native, left     Leaking abdominal aortic aneurysm (AAA) (HCC)     Liver cyst     Meniere disease, left     Pancreatic cyst     Renal cyst     Solitary pulmonary nodule         Social History     Socioeconomic History    Marital status:      Spouse name: Not on file    Number of children: Not on file    Years of education: Not on file    Highest education level: Not on file   Occupational History    Not on file   Social Needs    Financial resource strain: Not on file    Food insecurity:     Worry: Not on file     Inability: Not on file    Transportation needs:     Medical: Not on file     Non-medical: Not on file   Tobacco Use    Smoking status: Former Smoker     Last attempt to quit:      Years since quittin.7    Smokeless tobacco: Never Used   Substance and Sexual Activity    Alcohol use: No    Drug use: No    Sexual activity: Not on file   Lifestyle    Physical activity:     Days per week: Not on file     Minutes per session: Not on file    Stress: Not on file   Relationships    Social connections:     Talks on phone: Not on file     Gets together: Not on file     Attends [employer] service: Not on file     Active member of club or organization: Not on file     Attends meetings of clubs or organizations: Not on file     Relationship status: Not on file    Intimate partner violence:     Fear of current or ex partner: Not on file     Emotionally abused: Not on file     Physically abused: Not on file     Forced sexual activity: Not on file   Other Topics Concern    Not on file   Social History Narrative    Not on file       Current Outpatient Medications   Medication Sig Dispense Refill    gabapentin (NEURONTIN) 300 mg capsule Take 1 Cap by mouth nightly. Max Daily Amount: 300 mg. 90 Cap 1    folic acid/multivit-min/lutein (CENTRUM SILVER PO) Take 1 Tab by Mouth Once a Day.  loratadine (CLARITIN) 10 mg tablet 10 mg.      omeprazole (PRILOSEC) 20 mg capsule 20 mg.      umeclidinium-vilanterol (ANORO ELLIPTA) 62.5-25 mcg/actuation inhaler Take 1 Puff by inhalation daily.  acetaminophen (TYLENOL) 500 mg tablet 1,000 mg.  calcium-vitamin D 600 mg(1,500mg) -200 unit tab Take 1 Tab by Mouth Twice Daily.  fluticasone (FLONASE) 50 mcg/actuation nasal spray 2 Sprays.  carvedilol (COREG) 12.5 mg tablet Take  by mouth two (2) times daily (with meals).  furosemide (LASIX) 20 mg tablet Take  by mouth daily.  losartan (COZAAR) 50 mg tablet Take  by mouth daily.  lovastatin (MEVACOR) 40 mg tablet Take 40 mg by mouth nightly.  timolol (TIMOPTIC) 0.5 % ophthalmic solution 1 Drop two (2) times a day.  aspirin 81 mg tablet Take 81 mg by mouth.       aluminum & magnesium hydroxide-simethicone (MYLANTA II) 400-400-40 mg/5 mL suspension 30 mL.  nitroglycerin (NITROSTAT) 0.4 mg SL tablet 0.4 mg. Allergies   Allergen Reactions    Morphine Itching and Hives         REVIEW OF SYSTEMS    Constitutional: Negative for fever, chills, or weight change. Respiratory: Negative for cough or shortness of breath. Cardiovascular: Negative for chest pain or palpitations. Gastrointestinal: Negative for acid reflux, change in bowel habits, or constipation. Genitourinary: Negative for dysuria and flank pain. Musculoskeletal: Positive for cervical and lumbar pain. Skin: Negative for rash. Neurological: Negative for headaches, dizziness, or numbness. Endo/Heme/Allergies: Negative for increased bruising. Psychiatric/Behavioral: Negative for difficulty with sleep. PHYSICAL EXAMINATION  Visit Vitals  /90 (BP 1 Location: Right arm, BP Patient Position: Sitting)   Pulse 79   Temp 98.8 °F (37.1 °C) (Oral)   Ht 5' 4\" (1.626 m)   Wt 147 lb 12.8 oz (67 kg)   SpO2 100%   BMI 25.37 kg/m²       Constitutional: Awake, alert, and in no acute distress. Neurological: 1+ symmetrical DTRs in the upper extremities. 1+ symmetrical DTRs in the lower extremities. Sensation to light touch is intact. Negative Lopez's sign bilaterally. Skin: warm, dry, and intact. Musculoskeletal: Tenderness to palpation in the lower lumbar region. Moderate pain with extension and axial loading. No pain with internal or external rotation of her hips. Negative straight leg raise bilaterally. Biceps  Triceps Deltoids Wrist Ext Wrist Flex Hand Intrin   Right +4/5 +4/5 +4/5 +4/5 +4/5 +4/5   Left +4/5 +4/5 +4/5 +4/5 +4/5 +4/5      Hip Flex  Quads Hamstrings Ankle DF EHL Ankle PF   Right +4/5 +4/5 +4/5 +4/5 +4/5 +4/5   Left +4/5 +4/5 +4/5 +4/5 +4/5 +4/5     IMAGING:    Lumbar MRI from July 28, 2019 was personally reviewed with the patient and demonstrated:    1.  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 This is unchanged from previous imaging although quite subtle on previous CT but was fatty density in retrospect. 2. The conus medullaris is low-lying extending to just below the L2-L3 disc space level and appears to extend to the lipoma. In addition the caudal filum is somewhat thickened and fatty infiltrated. Cannot exclude tethering of the conus. 3. Approaching severe central stenosis at L3-L4. 4. Laminectomy changes at L4-L5 with persistent spondylolisthesis at this level secondary to facet arthropathy. Some thickening of nerve roots of the cauda equina and peripheral distribution of nerve roots consistent with an element of arachnoiditis at and below the surgical level. No recurrent or residual high-grade central or foraminal stenosis at this level with an element of right-sided lateral recess stenosis which could potentially affect the right L5 root. 5. Additional degenerative change but no additional evidence of herniation or high-grade stenosis. Signed By: Angela Moody MD on 7/28/2019 3:13 PM    Cervical spine x-rays from 07/11/2019 [facility]) were personally reviewed and demonstrated:  Bony fusion at C5-6. Multilevel degenerative facets. Anterior osteophyte formation at C6-7. Straightening of the cervical spine.      Lumbar spine x-rays from 07/11/2019 [facility]) were personally reviewed and demonstrated:  Degenerative disc at L3-4. Multilevel degenerative facets. Grade 1 to 2 listhesis at L4-5. Wedge deformity at T12.        Written by Riki Moritz, as dictated by Jourdan Smalls MD.  I, Dr. oJurdan Smalls confirm that all documentation is accurate.

## 2019-09-19 NOTE — PATIENT INSTRUCTIONS

## 2019-09-19 NOTE — LETTER
9/20/19 Patient: Rina Cuba YOB: 1940 Date of Visit: 9/19/2019 Sailaja Nguyen MD 
[address] [place] [address] VIA Facsimile: [phone] Dear Sailaja Nguyen MD, Thank you for referring Ms. Jason Valle to [facility] for evaluation. My notes for this consultation are attached. If you have questions, please do not hesitate to call me. I look forward to following your patient along with you. Sincerely, Long Martinez MD

## 2019-12-19 ENCOUNTER — OFFICE VISIT (OUTPATIENT)
Dept: ORTHOPEDIC SURGERY | Age: 79
End: 2019-12-19

## 2019-12-19 VITALS
WEIGHT: 147 LBS | HEIGHT: 64 IN | OXYGEN SATURATION: 97 % | SYSTOLIC BLOOD PRESSURE: 124 MMHG | BODY MASS INDEX: 25.1 KG/M2 | TEMPERATURE: 98.1 F | HEART RATE: 82 BPM | DIASTOLIC BLOOD PRESSURE: 84 MMHG | RESPIRATION RATE: 16 BRPM

## 2019-12-19 DIAGNOSIS — M62.838 MUSCLE SPASM: ICD-10-CM

## 2019-12-19 DIAGNOSIS — M48.062 SPINAL STENOSIS OF LUMBAR REGION WITH NEUROGENIC CLAUDICATION: ICD-10-CM

## 2019-12-19 DIAGNOSIS — M54.16 RIGHT LUMBAR RADICULOPATHY: Primary | ICD-10-CM

## 2019-12-19 DIAGNOSIS — M47.816 LUMBAR FACET ARTHROPATHY: ICD-10-CM

## 2019-12-19 RX ORDER — METHYLPREDNISOLONE 4 MG/1
TABLET ORAL
Qty: 1 DOSE PACK | Refills: 0 | Status: SHIPPED | OUTPATIENT
Start: 2019-12-19 | End: 2021-10-28 | Stop reason: ALTCHOICE

## 2019-12-19 RX ORDER — PREGABALIN 50 MG/1
50 CAPSULE ORAL 3 TIMES DAILY
Qty: 42 CAP | Refills: 0 | Status: SHIPPED | OUTPATIENT
Start: 2019-12-19 | End: 2021-10-28

## 2019-12-19 NOTE — PROGRESS NOTES
[facility]  [address] [place] [address], [facility] [address]  Phone: [phone]  Fax: [phone]    Pt's YOB: 1940    ASSESSMENT   Diagnoses and all orders for this visit:    1. Right lumbar radiculopathy  -     SCHEDULE SURGERY  -     pregabalin (LYRICA) 50 mg capsule; Take 1 Cap by mouth three (3) times daily. Max Daily Amount: 150 mg.    2. Spinal stenosis of lumbar region with neurogenic claudication  -     SCHEDULE SURGERY  -     pregabalin (LYRICA) 50 mg capsule; Take 1 Cap by mouth three (3) times daily. Max Daily Amount: 150 mg.    3. Lumbar facet arthropathy  -     methylPREDNISolone (MEDROL DOSEPACK) 4 mg tablet; Per dose pack instructions  -     SCHEDULE SURGERY    4. Muscle spasm  -     SCHEDULE SURGERY         IMPRESSION AND PLAN:  Bishop Jacobo is a [de-identified] y.o. female with history of cervical and lumbar pain. Pt complains of severe lower back pain radiating down the right leg into the foot, with numbness in the foot through the toes x 2 weeks. She states that since she scheduled the appointment 2-3 weeks ago she has started to experience pain in the left lower back. Pt takes Neurontin 300 mg 1 tab QHS. 1) Pt was given information on lumbar arthritis exercises. 2) She was scheduled for a right L5 SNRB. 3) Pt was prescribed a Medrol Dosepak. 4) She was prescribed Lyrica 50 mg 1 tab QAM and 2 tabs QHS, tapering up as directed, to take in place of the Neurontin. 5) Ms. Son Boles has a reminder for a \"due or due soon\" health maintenance. I have asked that she contact her primary care provider, Kira Sanchez MD, for follow-up on this health maintenance. 6)  demonstrated consistency with prescribing. Follow-up and Dispositions    · Return in about 6 weeks (around 1/30/2020) for Medication follow up, injection follow up.              HISTORY OF PRESENT ILLNESS:  Bishop Jacobo is a [de-identified] y.o. female with history of cervical and lumbar pain and presents to the office today for follow up. Pt complains of severe lower back pain radiating down the right leg into the foot, with numbness in the foot through the toes x 2 weeks. Pt reports improvement in her leg pain when she lays down and with rest. She states that since she scheduled the appointment 2-3 weeks ago she has started to experience pain in the left lower back. Pt notes that she experiences pain radiating down the right leg kneeling to pray at night. She reports about 50% improvement in her pain since her first appointment at [address] [place] [address]. She previously followed up with Dr. Arlene Herrera for evaluation of the exophytic lipoma and she did not recommend surgery at that time. Pt takes Neurontin 300 mg 1 tab QHS and notes shakiness and sedation when she increased the Neurontin years ago. She reports improvement when taking a Medrol Dosepak in the past. Pt at this time desires to proceed with medication evaluation and steroid injections.     Pain Scale: /10    PCP: Danette Tovar MD     Past Medical History:   Diagnosis Date    AAA (abdominal aortic aneurysm) (HCC)     Adrenal cyst (HCC)     Arthritis     Asthma     Degenerative joint disease (DJD) of lumbar spine     DJD (degenerative joint disease) of cervical spine     BORIS (generalized anxiety disorder)     GERD (gastroesophageal reflux disease)     HLD (hyperlipidemia)     Hypertension     Kidney lesion, native, left     Leaking abdominal aortic aneurysm (AAA) (HCC)     Liver cyst     Meniere disease, left     Pancreatic cyst     Renal cyst     Solitary pulmonary nodule         Social History     Socioeconomic History    Marital status:      Spouse name: Not on file    Number of children: Not on file    Years of education: Not on file    Highest education level: Not on file   Occupational History    Not on file   Social Needs    Financial resource strain: Not on file    Food insecurity:     Worry: Not on file     Inability: Not on file    Transportation needs:     Medical: Not on file     Non-medical: Not on file   Tobacco Use    Smoking status: Former Smoker     Last attempt to quit: 2005     Years since quittin.9    Smokeless tobacco: Never Used   Substance and Sexual Activity    Alcohol use: No    Drug use: No    Sexual activity: Not on file   Lifestyle    Physical activity:     Days per week: Not on file     Minutes per session: Not on file    Stress: Not on file   Relationships    Social connections:     Talks on phone: Not on file     Gets together: Not on file     Attends [employer] service: Not on file     Active member of club or organization: Not on file     Attends meetings of clubs or organizations: Not on file     Relationship status: Not on file    Intimate partner violence:     Fear of current or ex partner: Not on file     Emotionally abused: Not on file     Physically abused: Not on file     Forced sexual activity: Not on file   Other Topics Concern    Not on file   Social History Narrative    Not on file       Current Outpatient Medications   Medication Sig Dispense Refill    methylPREDNISolone (MEDROL DOSEPACK) 4 mg tablet Per dose pack instructions 1 Dose Pack 0    pregabalin (LYRICA) 50 mg capsule Take 1 Cap by mouth three (3) times daily. Max Daily Amount: 150 mg. 42 Cap 0    tamsulosin (FLOMAX) 0.4 mg capsule Take 1 Cap by mouth daily (after dinner). 30 Cap 12    gabapentin (NEURONTIN) 300 mg capsule Take 1 Cap by mouth nightly. Max Daily Amount: 300 mg. 90 Cap 1    folic acid/multivit-min/lutein (CENTRUM SILVER PO) Take 1 Tab by Mouth Once a Day.  loratadine (CLARITIN) 10 mg tablet 10 mg.      nitroglycerin (NITROSTAT) 0.4 mg SL tablet 0.4 mg.      omeprazole (PRILOSEC) 20 mg capsule 20 mg.      umeclidinium-vilanterol (ANORO ELLIPTA) 62.5-25 mcg/actuation inhaler Take 1 Puff by inhalation daily.       acetaminophen (TYLENOL) 500 mg tablet 1,000 mg.     Favio Hurtado calcium-vitamin D 600 mg(1,500mg) -200 unit tab Take 1 Tab by Mouth Twice Daily.  fluticasone (FLONASE) 50 mcg/actuation nasal spray 2 Sprays.  carvedilol (COREG) 12.5 mg tablet Take  by mouth two (2) times daily (with meals).  furosemide (LASIX) 20 mg tablet Take  by mouth daily.  losartan (COZAAR) 50 mg tablet Take  by mouth daily.  lovastatin (MEVACOR) 40 mg tablet Take 40 mg by mouth nightly.  timolol (TIMOPTIC) 0.5 % ophthalmic solution 1 Drop two (2) times a day.  aspirin 81 mg tablet Take 81 mg by mouth.  aluminum & magnesium hydroxide-simethicone (MYLANTA II) 400-400-40 mg/5 mL suspension 30 mL. Allergies   Allergen Reactions    Morphine Itching and Hives         REVIEW OF SYSTEMS    Constitutional: Negative for fever, chills, or weight change. Respiratory: Negative for cough or shortness of breath. Cardiovascular: Negative for chest pain or palpitations. Gastrointestinal: Negative for acid reflux, change in bowel habits, or constipation. Genitourinary: Negative for dysuria and flank pain. Musculoskeletal: Positive for lumbar pain. Skin: Negative for rash. Neurological: Negative for headaches or dizziness. Positive for numbness in the right foot. Endo/Heme/Allergies: Negative for increased bruising. Psychiatric/Behavioral: Negative for difficulty with sleep. PHYSICAL EXAMINATION  Visit Vitals  /84 (BP 1 Location: Left arm, BP Patient Position: Sitting)   Pulse 82   Temp 98.1 °F (36.7 °C) (Oral)   Resp 16   Ht 5' 4\" (1.626 m)   Wt 147 lb (66.7 kg)   SpO2 97%   BMI 25.23 kg/m²       Constitutional: Awake, alert, and in no acute distress. Neurological: 1+ symmetrical DTRs in the upper extremities. 1+ symmetrical DTRs in the lower extremities. Sensation to light touch is intact. Negative Lopez's sign bilaterally. Skin: warm, dry, and intact. Musculoskeletal: Tenderness to palpation in the lower lumbar region.  Moderate pain with extension and axial loading, or forward flexion. No pain with internal or external rotation of her hips. Negative straight leg raise bilaterally. Negative slump test bilaterally. Biceps  Triceps Deltoids Wrist Ext Wrist Flex Hand Intrin   Right +4/5 +4/5 +4/5 +4/5 +4/5 +4/5   Left +4/5 +4/5 +4/5 +4/5 +4/5 +4/5      Hip Flex  Quads Hamstrings Ankle DF EHL Ankle PF   Right +4/5 +4/5 +4/5 +4/5 +4/5 +4/5   Left +4/5 +4/5 +4/5 +4/5 +4/5 +4/5     IMAGING:    Lumbar MRI from 07/28/2019 was personally reviewed with the patient and demonstrated:  1. 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 This is unchanged from previous imaging although quite subtle on previous CT but was fatty density in retrospect. 2. The conus medullaris is low-lying extending to just below the L2-L3 disc space level and appears to extend to the lipoma. In addition the caudal filum is somewhat thickened and fatty infiltrated. Cannot exclude tethering of the conus. 3. Approaching severe central stenosis at L3-L4. 4. Laminectomy changes at L4-L5 with persistent spondylolisthesis at this level secondary to facet arthropathy. Some thickening of nerve roots of the cauda equina and peripheral distribution of nerve roots consistent with an element of arachnoiditis at and below the surgical level. No recurrent or residual high-grade central or foraminal stenosis at this level with an element of right-sided lateral recess stenosis which could potentially affect the right L5 root. 5. Additional degenerative change but no additional evidence of herniation or high-grade stenosis.       Cervical spine x-rays from 07/11/2019 [facility]) were personally reviewed and demonstrated:  Bony fusion at C5-6. Multilevel degenerative facets. Anterior osteophyte formation at C6-7.  Straightening of the cervical spine.      Lumbar spine x-rays from 07/11/2019 [facility]) were personally reviewed and demonstrated:  Degenerative disc at L3-4. Multilevel degenerative facets. Grade 1 to 2 listhesis at L4-5. Wedge deformity at T12.      Written by Shirley Ceballos, as dictated by Barbara Gottlieb MD.  I, Dr. Barbara Gottlieb confirm that all documentation is accurate.

## 2019-12-19 NOTE — PATIENT INSTRUCTIONS
Low Back Arthritis: Exercises Introduction Here are some examples of typical rehabilitation exercises for your condition. Start each exercise slowly. Ease off the exercise if you start to have pain. Your doctor or physical therapist will tell you when you can start these exercises and which ones will work best for you. When you are not being active, find a comfortable position for rest. Some people are comfortable on the floor or a medium-firm bed with a small pillow under their head and another under their knees. Some people prefer to lie on their side with a pillow between their knees. Don't stay in one position for too long. Take short walks (10 to 20 minutes) every 2 to 3 hours. Avoid slopes, hills, and stairs until you feel better. Walk only distances you can manage without pain, especially leg pain. How to do the exercises Pelvic tilt 1. Lie on your back with your knees bent. 2. \"Brace\" your stomachtighten your muscles by pulling in and imagining your belly button moving toward your spine. 3. Press your lower back into the floor. You should feel your hips and pelvis rock back. 4. Hold for 6 seconds while breathing smoothly. 5. Relax and allow your pelvis and hips to rock forward. 6. Repeat 8 to 12 times. Back stretches 1. Get down on your hands and knees on the floor. 2. Relax your head and allow it to droop. Round your back up toward the ceiling until you feel a nice stretch in your upper, middle, and lower back. Hold this stretch for as long as it feels comfortable, or about 15 to 30 seconds. 3. Return to the starting position with a flat back while you are on your hands and knees. 4. Let your back sway by pressing your stomach toward the floor. Lift your buttocks toward the ceiling. 5. Hold this position for 15 to 30 seconds. 6. Repeat 2 to 4 times. Follow-up care is a key part of your treatment and safety.  Be sure to make and go to all appointments, and call your doctor if you are having problems. It's also a good idea to know your test results and keep a list of the medicines you take. Where can you learn more? Go to http://bebeto-nancy.info/. Enter G291 in the search box to learn more about \"Low Back Arthritis: Exercises. \" Current as of: June 26, 2019 Content Version: 12.2 © 2847-5477 [employer]. Care instructions adapted under license by [employer] (which disclaims liability or warranty for this information). If you have questions about a medical condition or this instruction, always ask your healthcare professional. [address] any warranty or liability for your use of this information.

## 2019-12-19 NOTE — LETTER
12/22/19 Patient: Yariel Claros YOB: 1940 Date of Visit: 12/19/2019 Radha Ashraf MD 
[address] [address] [place] [address] VIA Facsimile: [phone] Dear Radha Ashraf MD, Thank you for referring Ms. Jaun Nielsen to [facility] for evaluation. My notes for this consultation are attached. If you have questions, please do not hesitate to call me. I look forward to following your patient along with you. Sincerely, Jane Guzman MD

## 2020-01-15 ENCOUNTER — APPOINTMENT (OUTPATIENT)
Dept: GENERAL RADIOLOGY | Age: 80
End: 2020-01-15
Attending: PHYSICAL MEDICINE & REHABILITATION
Payer: MEDICARE

## 2020-01-15 ENCOUNTER — HOSPITAL ENCOUNTER (OUTPATIENT)
Age: 80
Setting detail: OUTPATIENT SURGERY
Discharge: HOME | End: 2020-01-15
Attending: PHYSICAL MEDICINE & REHABILITATION | Admitting: PHYSICAL MEDICINE & REHABILITATION
Payer: MEDICARE

## 2020-01-15 VITALS
OXYGEN SATURATION: 97 % | HEIGHT: 64 IN | WEIGHT: 147 LBS | HEART RATE: 70 BPM | RESPIRATION RATE: 16 BRPM | DIASTOLIC BLOOD PRESSURE: 74 MMHG | BODY MASS INDEX: 25.1 KG/M2 | TEMPERATURE: 98.5 F | SYSTOLIC BLOOD PRESSURE: 112 MMHG

## 2020-01-15 PROCEDURE — 74011250637 HC RX REV CODE- 250/637: Performed by: PHYSICAL MEDICINE & REHABILITATION

## 2020-01-15 PROCEDURE — 74011250636 HC RX REV CODE- 250/636: Performed by: PHYSICAL MEDICINE & REHABILITATION

## 2020-01-15 PROCEDURE — 77030039433 HC TY MYLEOGRAM BD -B: Performed by: PHYSICAL MEDICINE & REHABILITATION

## 2020-01-15 PROCEDURE — 74011000250 HC RX REV CODE- 250: Performed by: PHYSICAL MEDICINE & REHABILITATION

## 2020-01-15 PROCEDURE — 77030003676 HC NDL SPN MPRI -A: Performed by: PHYSICAL MEDICINE & REHABILITATION

## 2020-01-15 PROCEDURE — 76010000009 HC PAIN MGT 0 TO 30 MIN PROC: Performed by: PHYSICAL MEDICINE & REHABILITATION

## 2020-01-15 PROCEDURE — 74011636320 HC RX REV CODE- 636/320: Performed by: PHYSICAL MEDICINE & REHABILITATION

## 2020-01-15 PROCEDURE — 77030003669 HC NDL SPN COOK -B: Performed by: PHYSICAL MEDICINE & REHABILITATION

## 2020-01-15 RX ORDER — LIDOCAINE HYDROCHLORIDE 10 MG/ML
INJECTION, SOLUTION EPIDURAL; INFILTRATION; INTRACAUDAL; PERINEURAL AS NEEDED
Status: DISCONTINUED | OUTPATIENT
Start: 2020-01-15 | End: 2020-01-15 | Stop reason: HOSPADM

## 2020-01-15 RX ORDER — DIAZEPAM 5 MG/1
5-20 TABLET ORAL ONCE
Status: COMPLETED | OUTPATIENT
Start: 2020-01-15 | End: 2020-01-15

## 2020-01-15 RX ORDER — SODIUM CHLORIDE 0.9 % (FLUSH) 0.9 %
5-40 SYRINGE (ML) INJECTION EVERY 8 HOURS
Status: DISCONTINUED | OUTPATIENT
Start: 2020-01-15 | End: 2020-01-15 | Stop reason: CLARIF

## 2020-01-15 RX ORDER — DEXAMETHASONE SODIUM PHOSPHATE 100 MG/10ML
INJECTION INTRAMUSCULAR; INTRAVENOUS AS NEEDED
Status: DISCONTINUED | OUTPATIENT
Start: 2020-01-15 | End: 2020-01-15 | Stop reason: HOSPADM

## 2020-01-15 RX ORDER — SODIUM CHLORIDE 0.9 % (FLUSH) 0.9 %
5-40 SYRINGE (ML) INJECTION AS NEEDED
Status: DISCONTINUED | OUTPATIENT
Start: 2020-01-15 | End: 2020-01-15 | Stop reason: CLARIF

## 2020-01-15 RX ADMIN — DIAZEPAM 10 MG: 5 TABLET ORAL at 13:35

## 2020-01-15 NOTE — DISCHARGE INSTRUCTIONS
[facility] [place]   ([place])  Dr. Vitaliy Jean Baptiste, Dr. Fozia Quinonez, Dr. Bob Mitchell not drive a car, operate heavy machinery or dangerous equipment for 24 hours. * Activity as tolerated; rest for the remainder of the day. * Resume pre-block medications including those for your family doctor. * Do not drink alcoholic beverages for 24 hours. Alcohol and the medications you have received may interact and cause an adverse reaction. * You may feel better this evening and worse tomorrow, as the numbing medications wears off and the steroid has yet to begin to work. After 48 hrs the steroid should begin to release bringing you relief. * You may shower this evening and remove any bandages. * Avoid hot tubs and heating pads for 24 hours. You may use cold packs on the procedure site as tolerated for the first 24 hours. * If a headache develops, drink plenty of fluids and rest.  Take over the counter medications for headache if needed. If the headache continues longer than 24 hours, call MD at the [address] [place] [address]. [phone]    * Continue taking pain medications as needed. * You may resume your regular diet if tolerated. Otherwise, start with sips of water and advance slowly. * If Diabetic: check your blood sugar three times a day for the next 3 days. If your sugar is greater than 300 call your family doctor. If your sugar is greater than 400, have someone transport you to the nearest Emergency Room. * If you experience any of the following problems, Please Call the [address] [place] [address] at [phone].         * Shortness of Breath    * Fever of 101 or higher    * Nausea / Vomiting    * Severe Headache    * Weakness or numbness in arms or legs that is not      resolving    * Prolonged increase in pain greater than 4 days      DISCHARGE SUMMARY from Nurse      PATIENT INSTRUCTIONS:    After oral sedation, for 24 hours or while taking prescription Narcotics:  · Limit your activities  · Do not drive and operate hazardous machinery  · Do not make important personal or business decisions  · Do  not drink alcoholic beverages  · If you have not urinated within 8 hours after discharge, please contact your surgeon on call. Report the following to your surgeon:  · Excessive pain, swelling, redness or odor of or around the surgical area  · Temperature over 101  · Nausea and vomiting lasting longer than 4 hours or if unable to take medications  · Any signs of decreased circulation or nerve impairment to extremity: change in color, persistent  numbness, tingling, coldness or increase pain  · Any questions            What to do at Home:  Recommended activity: Activity as tolerated, NO DRIVING FOR 12 Hours post injection          *  Please give a list of your current medications to your Primary Care Provider. *  Please update this list whenever your medications are discontinued, doses are      changed, or new medications (including over-the-counter products) are added. *  Please carry medication information at all times in case of emergency situations. These are general instructions for a healthy lifestyle:    No smoking/ No tobacco products/ Avoid exposure to second hand smoke    Surgeon General's Warning:  Quitting smoking now greatly reduces serious risk to your health. Obesity, smoking, and sedentary lifestyle greatly increases your risk for illness    A healthy diet, regular physical exercise & weight monitoring are important for maintaining a healthy lifestyle    You may be retaining fluid if you have a history of heart failure or if you experience any of the following symptoms:  Weight gain of 3 pounds or more overnight or 5 pounds in a week, increased swelling in our hands or feet or shortness of breath while lying flat in bed.   Please call your doctor as soon as you notice any of these symptoms; do not wait until your next office visit. Recognize signs and symptoms of STROKE:    F-face looks uneven    A-arms unable to move or move unevenly    S-speech slurred or non-existent    T-time-call 911 as soon as signs and symptoms begin-DO NOT go       Back to bed or wait to see if you get better-TIME IS BRAIN.

## 2020-01-15 NOTE — PROCEDURES
PROCEDURE NOTE      Patient Name: Ruba Mancini    Date of Procedure: January 15, 2020    Preoperative Diagnosis:  RADICULOPATHY  SPINAL STENOSIS    Post Operative Diagnosis:  RADICULOPATHY  SPINAL STENOSIS    Procedure :    right L5 Selective Nerve Root Block    Consent:  Informed consent was obtained prior to the procedure. The patient was given the opportunity to ask questions regarding the procedure and its associated risks. In addition to the potential risks associated with the procedure itself, the patient was informed both verbally and in writing of the potential side effects of the use of glucocorticoid. The patient appeared to comprehend the informed consent and desired to have the procedure performed. Procedure: The patient was placed in the prone position on the fluoroscopy table and the back was prepped and draped in the usual sterile manner. The exact spinal level was  identified using fluoroscopy, and Lidocaine 1 % was injected locally, a # 22 gauge spinal needle was passed to the transverse process. The depth was noted and the needle redirected to pass inferior and approximately one cm anterior to the transverse process. YES  1 cc of Isovue M-200 was used to verify positioning in the epidural and paravertebral space and outlined the course of the spinal nerve into the epidural space. The same procedure was repeated at each spinal level indicated above. A total of 10 mg of preservative free Dexamethasone and 5 cc of Lidocaine was slowly injected. The patient tolerated the procedure well. The injection area was cleaned and bandaids applied. Not excessive bleeding was noted. Patient dressed and discharged to home with instructions. Discussion: The patient tolerated the procedure well.                                               Laura Cordero MD  January 15, 2020

## 2020-01-15 NOTE — PERIOP NOTES
Patient tolerated procedure well. No complications noted. VSS. No redness, swelling, or bleeding from injection site. Dressing dry and intact. Armband removed and shredded. Patient discharged from facility in wheelchair to main entrance and discharged alive and well, in stable condition.

## 2020-01-15 NOTE — H&P
Date of Surgery Update:  Jerry Chavez was seen and examined. History and physical has been reviewed. The patient has been examined. There have been no significant clinical changes since the last office visit.       Signed By: Conchis Harley MD     January 15, 2020 1:49 PM

## 2021-10-28 ENCOUNTER — OFFICE VISIT (OUTPATIENT)
Dept: ORTHOPEDIC SURGERY | Age: 81
End: 2021-10-28
Payer: MEDICARE

## 2021-10-28 VITALS
HEART RATE: 76 BPM | OXYGEN SATURATION: 98 % | WEIGHT: 135 LBS | BODY MASS INDEX: 23.05 KG/M2 | TEMPERATURE: 97.8 F | HEIGHT: 64 IN | RESPIRATION RATE: 16 BRPM

## 2021-10-28 DIAGNOSIS — Z98.890 HISTORY OF ABDOMINAL AORTIC ANEURYSM REPAIR: ICD-10-CM

## 2021-10-28 DIAGNOSIS — M47.816 LUMBAR FACET ARTHROPATHY: ICD-10-CM

## 2021-10-28 DIAGNOSIS — Z86.16 PERSONAL HISTORY OF COVID-19: ICD-10-CM

## 2021-10-28 DIAGNOSIS — R26.9 GAIT ABNORMALITY: ICD-10-CM

## 2021-10-28 DIAGNOSIS — R29.898 WEAKNESS OF BOTH LEGS: Primary | ICD-10-CM

## 2021-10-28 DIAGNOSIS — M48.062 SPINAL STENOSIS OF LUMBAR REGION WITH NEUROGENIC CLAUDICATION: ICD-10-CM

## 2021-10-28 DIAGNOSIS — M62.838 MUSCLE SPASM: ICD-10-CM

## 2021-10-28 PROCEDURE — 72110 X-RAY EXAM L-2 SPINE 4/>VWS: CPT | Performed by: PHYSICAL MEDICINE & REHABILITATION

## 2021-10-28 PROCEDURE — G8427 DOCREV CUR MEDS BY ELIG CLIN: HCPCS | Performed by: PHYSICAL MEDICINE & REHABILITATION

## 2021-10-28 PROCEDURE — 99214 OFFICE O/P EST MOD 30 MIN: CPT | Performed by: PHYSICAL MEDICINE & REHABILITATION

## 2021-10-28 PROCEDURE — G8432 DEP SCR NOT DOC, RNG: HCPCS | Performed by: PHYSICAL MEDICINE & REHABILITATION

## 2021-10-28 PROCEDURE — G8400 PT W/DXA NO RESULTS DOC: HCPCS | Performed by: PHYSICAL MEDICINE & REHABILITATION

## 2021-10-28 PROCEDURE — G8420 CALC BMI NORM PARAMETERS: HCPCS | Performed by: PHYSICAL MEDICINE & REHABILITATION

## 2021-10-28 PROCEDURE — 1090F PRES/ABSN URINE INCON ASSESS: CPT | Performed by: PHYSICAL MEDICINE & REHABILITATION

## 2021-10-28 PROCEDURE — 1101F PT FALLS ASSESS-DOCD LE1/YR: CPT | Performed by: PHYSICAL MEDICINE & REHABILITATION

## 2021-10-28 PROCEDURE — G8536 NO DOC ELDER MAL SCRN: HCPCS | Performed by: PHYSICAL MEDICINE & REHABILITATION

## 2021-10-28 RX ORDER — METHYLPREDNISOLONE 4 MG/1
TABLET ORAL
Qty: 1 DOSE PACK | Refills: 0 | Status: SHIPPED | OUTPATIENT
Start: 2021-10-28 | End: 2021-11-12 | Stop reason: SDUPTHER

## 2021-10-28 NOTE — PROGRESS NOTES
[facility]  [address] [facility], [facility]   Phone: [phone]  Fax: [phone]    Pt's YOB: 1940    ASSESSMENT   Diagnoses and all orders for this visit:    1. Weakness of both legs  -     MRI LUMB SPINE W WO CONT; Future  -     AMB POC XRAY, SPINE, LUMBOSACRAL; 4+    2. Spinal stenosis of lumbar region with neurogenic claudication  -     MRI LUMB SPINE W WO CONT; Future  -     AMB POC XRAY, SPINE, LUMBOSACRAL; 4+    3. Lumbar facet arthropathy  -     MRI LUMB SPINE W WO CONT; Future  -     methylPREDNISolone (MEDROL DOSEPACK) 4 mg tablet; Per dose pack instructions  -     AMB POC XRAY, SPINE, LUMBOSACRAL; 4+    4. Muscle spasm    5. Gait abnormality    6. History of abdominal aortic aneurysm repair    7. Personal history of COVID-19         IMPRESSION AND PLAN:  Siri Engle is a 79 y.o. female with history of cervical and lower lumbar pain and presents to the office today for follow up. Pt complains of severe lower back pain radiating to the lower abdomen, right thigh pain radiating down the leg to the knee, and bilateral leg weakness beginning in 8/2021. Pt is taking Tylenol 500 mg as needed. 1) Pt was given information on lumbar arthritis exercises. 2) I recommended that the patient ambulate with the assistance of a cane. 3) Discussed treatment options with the patient including medications and physical therapy. 4) A lumbar MRI with contrast was ordered-- concern for worsening stenosis. 5) Pt was prescribed a Medrol dosepack. 6) Lumbar spine x-rays were ordered. 7) Ms. Eloisa Ellis has a reminder for a \"due or due soon\" health maintenance. I have asked that she contact her primary care provider, Jessica Rodriguez MD, for follow-up on this health maintenance. 8)  demonstrated consistency with prescribing.    9) Pt will continue with current dose of gabapentin  Follow-up and Dispositions    · Return in about 5 weeks (around 12/2/2021) for Medication follow up, Diagnostic Test follow up. HISTORY OF PRESENT ILLNESS:  Kelly Mccarthy is a 79 y.o. female with history of cervical and lower lumbar pain and presents to the office today for follow up. Pt complains of severe lower back pain radiating to the lower abdomen, right thigh pain radiating down the leg to the knee, and bilateral leg weakness. She states that the pain and weakness began in 8/2021 when she was hospitalized for pancreatitis with an incidental finding of a leaking aortic aneurysm. Pt notes that she became confused and was restrained and notes that the pain began after she struggled against the restraints. She denies that the weakness is due to simple atrophy and notes that the pain disrupts her sleep and is relieved by massage. Pt is taking Lyrica 50 mg 1 tab QHS with relief and that it assists her with sleeping. She notes that she was previously prescribed Lyrica 50 mg 2 tabs QHS but found it too sedating. Pt denies using any assisting devices with ambulation but admits to using walls for support when descending stairs. She notes that she has previously had COVID-19 and has received the COVID-19 vaccination. Pt states that she has had a previous cervical surgery by Dr. Gilberto Rojo and lumbar surgery by Dr. Limon. She notes that she has taken Tylenol #3 in the past with relief and currently takes Tylenol 500 mg 1 tab QHS as needed with some relief. Pt at this time desires to proceed with medication evaluation and a lumbar MRI.     Pain Scale: 8/10    PCP: Malcolm Lindsey MD     Past Medical History:   Diagnosis Date    AAA [facility]abdominal aortic aneurysm) ([facility] [address]     Adrenal cyst (HCC)     Arthritis     Asthma     Degenerative joint disease (DJD) of lumbar spine     DJD (degenerative joint disease) of cervical spine     BORIS (generalized anxiety disorder)     GERD (gastroesophageal reflux disease)     HLD (hyperlipidemia)     Hypertension     Kidney lesion, native, left     Leaking abdominal aortic aneurysm (AAA) (HCC)     Liver cyst     Meniere disease, left     Pancreatic cyst     Renal cyst     Solitary pulmonary nodule         Social History     Socioeconomic History    Marital status:      Spouse name: Not on file    Number of children: Not on file    Years of education: Not on file    Highest education level: Not on file   Occupational History    Not on file   Tobacco Use    Smoking status: Former Smoker     Quit date:      Years since quittin.8    Smokeless tobacco: Never Used   Substance and Sexual Activity    Alcohol use: No    Drug use: No    Sexual activity: Not on file   Other Topics Concern    Not on file   Social History Narrative    Not on file     Social Determinants of Health     Financial Resource Strain:     Difficulty of Paying Living Expenses:    Food Insecurity:     Worried About Running Out of Food in the Last Year:     Ran Out of Food in the Last Year:    Transportation Needs:     Lack of Transportation (Medical):  Lack of Transportation (Non-Medical):    Physical Activity:     Days of Exercise per Week:     Minutes of Exercise per Session:    Stress:     Feeling of Stress :    Social Connections:     Frequency of Communication with Friends and Family:     Frequency of Social Gatherings with Friends and Family:     Attends Holiness Services:     Active Member of Clubs or Organizations:     Attends Club or Organization Meetings:     Marital Status:    Intimate Partner Violence:     Fear of Current or Ex-Partner:     Emotionally Abused:     Physically Abused:     Sexually Abused:        Current Outpatient Medications   Medication Sig Dispense Refill    methylPREDNISolone (MEDROL DOSEPACK) 4 mg tablet Per dose pack instructions 1 Dose Pack 0    tamsulosin (FLOMAX) 0.4 mg capsule Take 1 Cap by mouth daily (after dinner).  30 Cap 12    gabapentin (NEURONTIN) 300 mg capsule Take 1 Cap by mouth nightly. Max Daily Amount: 300 mg. 90 Cap 1    folic acid/multivit-min/lutein (CENTRUM SILVER PO) Take 1 Tab by Mouth Once a Day.  loratadine (CLARITIN) 10 mg tablet 10 mg.      aluminum & magnesium hydroxide-simethicone (MYLANTA II) 400-400-40 mg/5 mL suspension 30 mL.  nitroglycerin (NITROSTAT) 0.4 mg SL tablet 0.4 mg.      omeprazole (PRILOSEC) 20 mg capsule 20 mg.      umeclidinium-vilanterol (ANORO ELLIPTA) 62.5-25 mcg/actuation inhaler Take 1 Puff by inhalation daily.  acetaminophen (TYLENOL) 500 mg tablet 1,000 mg.  calcium-vitamin D 600 mg(1,500mg) -200 unit tab Take 1 Tab by Mouth Twice Daily.  fluticasone (FLONASE) 50 mcg/actuation nasal spray 2 Sprays.  carvedilol (COREG) 12.5 mg tablet Take  by mouth two (2) times daily (with meals).  furosemide (LASIX) 20 mg tablet Take  by mouth daily.  losartan (COZAAR) 50 mg tablet Take  by mouth daily.  lovastatin (MEVACOR) 40 mg tablet Take 40 mg by mouth nightly.  timolol (TIMOPTIC) 0.5 % ophthalmic solution 1 Drop two (2) times a day.  aspirin 81 mg tablet Take 81 mg by mouth. Allergies   Allergen Reactions    Morphine Itching and Hives         REVIEW OF SYSTEMS    Constitutional: Negative for fever, chills, or weight change. Respiratory: Negative for cough or shortness of breath. Cardiovascular: Negative for chest pain or palpitations. Gastrointestinal: Negative for acid reflux, change in bowel habits, or constipation. Genitourinary: Negative for dysuria and flank pain. Musculoskeletal: Positive for lumbar and leg pain. Skin: Negative for rash. Neurological: Negative for headaches, dizziness or numbness. Endo/Heme/Allergies: Negative for increased bruising. Psychiatric/Behavioral: Positive for difficulty with sleep.     As per HPI    PHYSICAL EXAMINATION  Visit Vitals  Pulse 76   Temp 97.8 °F (36.6 °C)   Resp 16   Ht 5' 4\" (1.626 m)   Wt 135 lb (61.2 kg)   SpO2 98%   BMI 23.17 kg/m²       Constitutional: Awake, alert, and in no acute distress. Neurological: 1+ symmetrical DTRs in the upper extremities. 1+ symmetrical DTRs in the lower extremities. Sensation to light touch is intact. Negative Lopez's sign bilaterally. Skin: warm, dry, and intact. Musculoskeletal: No pain with extension, axial loading, or forward flexion. No pain with internal or external rotation of her hips. Negative straight leg raise bilaterally. Biceps  Triceps Deltoids Wrist Ext Wrist Flex Hand Intrin   Right +4/5 +4/5 +4/5 +4/5 +4/5 +4/5   Left +4/5 +4/5 +4/5 +4/5 +4/5 +4/5      Hip Flex  Quads Hamstrings Ankle DF EHL Ankle PF   Right -4/5 -4/5 -4/5 -4/5 -4/5 -4/5   Left +4/5 +4/5 +4/5 +4/5 +4/5 +4/5     IMAGING:    Lumbar spine 4V x-rays from 10/28/2021 were personally reviewed with the patient and demonstrated:  Grade1-2 listhesis at L4-5. Severe degenerative disc disease at L5-S1 and L4-5. Multilevel degenerative facet. Aortic graft present. Lumbar MRI from 07/28/2019 was personally reviewed with the patient and demonstrated:  1. 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 This is unchanged from previous imaging although quite subtle on previous CT but was fatty density in retrospect. 2. The conus medullaris is low-lying extending to just below the L2-L3 disc space level and appears to extend to the lipoma. In addition the caudal filum is somewhat thickened and fatty infiltrated. Cannot exclude tethering of the conus. 3. Approaching severe central stenosis at L3-L4. 4. Laminectomy changes at L4-L5 with persistent spondylolisthesis at this level secondary to facet arthropathy. Some thickening of nerve roots of the cauda equina and peripheral distribution of nerve roots consistent with an element of arachnoiditis at and below the surgical level.  No recurrent or residual high-grade central or foraminal stenosis at this level with an element of right-sided lateral recess stenosis which could potentially affect the right L5 root. 5. Additional degenerative change but no additional evidence of herniation or high-grade stenosis.     Cervical spine x-rays from 07/11/2019 [facility]) were personally reviewed and demonstrated:  Bony fusion at C5-6. Multilevel degenerative facets. Anterior osteophyte formation at C6-7. Straightening of the cervical spine.      Lumbar spine x-rays from 07/11/2019 [facility]) were personally reviewed and demonstrated:  Degenerative disc at L3-4. Multilevel degenerative facets. Grade 1 to 2 listhesis at L4-5. Wedge deformity at T12. Written by Paul Brown, as dictated by Jovita Tripp MD.  I, Dr. Jovita Tripp confirm that all documentation is accurate.

## 2021-10-28 NOTE — PATIENT INSTRUCTIONS
Low Back Arthritis: Exercises  Introduction  Here are some examples of typical rehabilitation exercises for your condition. Start each exercise slowly. Ease off the exercise if you start to have pain. Your doctor or physical therapist will tell you when you can start these exercises and which ones will work best for you. When you are not being active, find a comfortable position for rest. Some people are comfortable on the floor or a medium-firm bed with a small pillow under their head and another under their knees. Some people prefer to lie on their side with a pillow between their knees. Don't stay in one position for too long. Take short walks (10 to 20 minutes) every 2 to 3 hours. Avoid slopes, hills, and stairs until you feel better. Walk only distances you can manage without pain, especially leg pain. How to do the exercises  Pelvic tilt    1. Lie on your back with your knees bent. 2. \"Brace\" your stomachtighten your muscles by pulling in and imagining your belly button moving toward your spine. 3. Press your lower back into the floor. You should feel your hips and pelvis rock back. 4. Hold for 6 seconds while breathing smoothly. 5. Relax and allow your pelvis and hips to rock forward. 6. Repeat 8 to 12 times. Back stretches    1. Get down on your hands and knees on the floor. 2. Relax your head and allow it to droop. Round your back up toward the ceiling until you feel a nice stretch in your upper, middle, and lower back. Hold this stretch for as long as it feels comfortable, or about 15 to 30 seconds. 3. Return to the starting position with a flat back while you are on your hands and knees. 4. Let your back sway by pressing your stomach toward the floor. Lift your buttocks toward the ceiling. 5. Hold this position for 15 to 30 seconds. 6. Repeat 2 to 4 times. Follow-up care is a key part of your treatment and safety.  Be sure to make and go to all appointments, and call your doctor if you are having problems. It's also a good idea to know your test results and keep a list of the medicines you take. Where can you learn more? Go to http://www.Modebo.com/  Enter T094 in the search box to learn more about \"Low Back Arthritis: Exercises. \"  Current as of: July 1, 2021               Content Version: 13.0  © 1510-2621 [employer]. Care instructions adapted under license by [employer] (which disclaims liability or warranty for this information). If you have questions about a medical condition or this instruction, always ask your healthcare professional. [place] [address] any warranty or liability for your use of this information.

## 2021-11-04 DIAGNOSIS — M47.816 LUMBAR FACET ARTHROPATHY: ICD-10-CM

## 2021-11-04 DIAGNOSIS — M48.062 SPINAL STENOSIS OF LUMBAR REGION WITH NEUROGENIC CLAUDICATION: ICD-10-CM

## 2021-11-04 DIAGNOSIS — R29.898 LEFT LEG WEAKNESS: ICD-10-CM

## 2021-11-08 ENCOUNTER — TELEPHONE (OUTPATIENT)
Dept: ORTHOPEDIC SURGERY | Age: 81
End: 2021-11-08

## 2021-11-08 NOTE — TELEPHONE ENCOUNTER
I called and spoke to the pt regarding her message. The pt was made aware that the order has been sent to the [facility] central scheduling dept and will take about 24 hrs to get put in the system. Then someone from the scheduling dept will reach out to her. Ms. Sally Amador verbalized understanding. She is still having some ongoing discomfort and did not know if there was anything else she can take. She has finished her medrol dose pack. She reports that this did provide some relief and her pain has not been as bad. Message will be routed to provider for review. Pt aware that we will contact her once provider has responded.

## 2021-11-08 NOTE — TELEPHONE ENCOUNTER
Patient [phone]    Patient ended up scheduling her MRI for this morning through [facility]. She had wanted [facility]. She went to [facility] and of course there was no appt. She is requesting we resend to [facility] to have this MRI done there. Also, she wants a return call to discuss her continued pain now that she has completed her medication as directed. Please return call.

## 2021-11-08 NOTE — TELEPHONE ENCOUNTER
Kayce Moctezuma 1 hour ago (1:55 PM)     MP    MRI of the Lumbar Spine with and without contrast has been faxed to [facility] for scheduling, [phone], fax [phone]. Please advise patient to cancel her appointment with [facility]. Central scheduling can be reached at [phone]. Also, please address pain complaint.     Message text

## 2021-11-09 RX ORDER — METHYLPREDNISOLONE 4 MG/1
TABLET ORAL
Qty: 1 DOSE PACK | Refills: 0 | Status: CANCELLED | OUTPATIENT
Start: 2021-11-09

## 2021-11-09 NOTE — TELEPHONE ENCOUNTER
Patient states she has only taken the one MDP. I gave her the instructions given by Dr Johnnie Harding and Lindsay Bhatt was called in.

## 2021-11-09 NOTE — TELEPHONE ENCOUNTER
Cyndi Armendariz MD  P [facility]  Caller: Unspecified (Yesterday, 11:43 AM)  The Medrol Dose pack can be repeated once if she has only had 1 course of steroids recently -- she may also use acetaminophen 500 mg 1-2 po bid - tid as needed for pain

## 2021-11-12 DIAGNOSIS — M47.816 LUMBAR FACET ARTHROPATHY: ICD-10-CM

## 2021-11-12 RX ORDER — METHYLPREDNISOLONE 4 MG/1
TABLET ORAL
Qty: 1 DOSE PACK | Refills: 0 | Status: SHIPPED | OUTPATIENT
Start: 2021-11-12

## 2021-12-16 ENCOUNTER — OFFICE VISIT (OUTPATIENT)
Dept: ORTHOPEDIC SURGERY | Age: 81
End: 2021-12-16
Payer: MEDICARE

## 2021-12-16 VITALS
TEMPERATURE: 97.4 F | BODY MASS INDEX: 23.05 KG/M2 | HEART RATE: 78 BPM | OXYGEN SATURATION: 98 % | SYSTOLIC BLOOD PRESSURE: 141 MMHG | WEIGHT: 135 LBS | DIASTOLIC BLOOD PRESSURE: 83 MMHG | HEIGHT: 64 IN

## 2021-12-16 DIAGNOSIS — M62.838 MUSCLE SPASM: ICD-10-CM

## 2021-12-16 DIAGNOSIS — Z98.890 HISTORY OF LUMBAR SURGERY: ICD-10-CM

## 2021-12-16 DIAGNOSIS — M47.816 LUMBAR FACET ARTHROPATHY: Primary | ICD-10-CM

## 2021-12-16 DIAGNOSIS — Z86.16 PERSONAL HISTORY OF COVID-19: ICD-10-CM

## 2021-12-16 DIAGNOSIS — R26.9 GAIT ABNORMALITY: ICD-10-CM

## 2021-12-16 DIAGNOSIS — M48.062 SPINAL STENOSIS OF LUMBAR REGION WITH NEUROGENIC CLAUDICATION: ICD-10-CM

## 2021-12-16 DIAGNOSIS — M53.3 PAIN OF RIGHT SACROILIAC JOINT: ICD-10-CM

## 2021-12-16 PROCEDURE — 1090F PRES/ABSN URINE INCON ASSESS: CPT | Performed by: PHYSICAL MEDICINE & REHABILITATION

## 2021-12-16 PROCEDURE — G8420 CALC BMI NORM PARAMETERS: HCPCS | Performed by: PHYSICAL MEDICINE & REHABILITATION

## 2021-12-16 PROCEDURE — G8432 DEP SCR NOT DOC, RNG: HCPCS | Performed by: PHYSICAL MEDICINE & REHABILITATION

## 2021-12-16 PROCEDURE — G8400 PT W/DXA NO RESULTS DOC: HCPCS | Performed by: PHYSICAL MEDICINE & REHABILITATION

## 2021-12-16 PROCEDURE — 1101F PT FALLS ASSESS-DOCD LE1/YR: CPT | Performed by: PHYSICAL MEDICINE & REHABILITATION

## 2021-12-16 PROCEDURE — 99214 OFFICE O/P EST MOD 30 MIN: CPT | Performed by: PHYSICAL MEDICINE & REHABILITATION

## 2021-12-16 PROCEDURE — G8536 NO DOC ELDER MAL SCRN: HCPCS | Performed by: PHYSICAL MEDICINE & REHABILITATION

## 2021-12-16 PROCEDURE — G8427 DOCREV CUR MEDS BY ELIG CLIN: HCPCS | Performed by: PHYSICAL MEDICINE & REHABILITATION

## 2021-12-16 RX ORDER — LIDOCAINE 50 MG/G
1 PATCH TOPICAL EVERY 24 HOURS
Qty: 30 EACH | Refills: 1 | Status: SHIPPED | OUTPATIENT
Start: 2021-12-16

## 2021-12-16 NOTE — PROGRESS NOTES
[facility]  [address] [facility], [facility]   Phone: [phone]  Fax: [phone]    Pt's YOB: 1940    ASSESSMENT   Diagnoses and all orders for this visit:    1. Lumbar facet arthropathy  -     lidocaine (LIDODERM) 5 %; 1 Patch by TransDERmal route every twenty-four (24) hours. Apply patch to the affected area for 12 hours a day and remove for 12 hours a day. 2. Pain of right sacroiliac joint  -     SCHEDULE SURGERY  -     lidocaine (LIDODERM) 5 %; 1 Patch by TransDERmal route every twenty-four (24) hours. Apply patch to the affected area for 12 hours a day and remove for 12 hours a day. 3. Spinal stenosis of lumbar region with neurogenic claudication  -     lidocaine (LIDODERM) 5 %; 1 Patch by TransDERmal route every twenty-four (24) hours. Apply patch to the affected area for 12 hours a day and remove for 12 hours a day. 4. Muscle spasm    5. Gait abnormality    6. Personal history of COVID-19    7. History of lumbar surgery         IMPRESSION AND PLAN:  David Ledesma is a 79 y.o. female with history of cervical and lumbar pain. Pt complains of lumbar pain exacerbated by activity and occasionally radiating into the right buttock and thigh. Pt is taking Tylenol 650 mg with some relief and Lyrica 50 mg 1 tab QHS and notes relief when previously taking a Medrol Dosepak. 1) Pt was given information on lumbar arthritis and sacroiliac exercises. 2) She was prescribed lidocaine 5% patches. 3) Pt will continue to take Lyrica 50 mg 1 tab and does not require a refill at this time. 4) A right sacroiliac injection was ordered. 5) Ms. Tory Stovall has a reminder for a \"due or due soon\" health maintenance. I have asked that she contact her primary care provider, Stephanie Madera MD, for follow-up on this health maintenance. 6)  demonstrated consistency with prescribing.    7) Pt's pancreatic cyst has been evaluation per pt and her daughter. Follow-up and Dispositions    · Return in about 6 weeks (around 1/27/2022) for Medication follow up. HISTORY OF PRESENT ILLNESS:  Lorri Murphy is a 79 y.o. female with history of cervical and lumbar pain and presents to the office today for MRI follow up. Pt complains of lumbar pain exacerbated by activity and occasionally radiating into the right buttock and thigh. Pt's daughter, who accompanies her to today's office visit, reports that her pain was exacerbated subsequent to a surgery in the summer of 2021. Pt's daughter further notes that the surgery was to repair a leaking blood vessel in her abdomen and lasted about 1 hour. Pt admits to occasional difficulty turning over in bed and reports an occasional clunking or slipping feeling in the right sacroiliac region when sitting down. Pt is taking Tylenol 650 mg with some relief and Lyrica 50 mg 1 tab QHS and notes relief when previously taking a Medrol Dosepak. She states she has not yet used lidocaine 5% patches. She notes previously undergoing steroid injections wth relief. Pt at this time desires to proceed with a right sacroiliac injection.     Pain Scale: 7/10    PCP: Michelle Quick MD     Past Medical History:   Diagnosis Date    AAA (abdominal aortic aneurysm) ([facility] [address]     Adrenal cyst (HCC)     Arthritis     Asthma     Degenerative joint disease (DJD) of lumbar spine     DJD (degenerative joint disease) of cervical spine     BORIS (generalized anxiety disorder)     GERD (gastroesophageal reflux disease)     HLD (hyperlipidemia)     Hypertension     Kidney lesion, native, left     Leaking abdominal aortic aneurysm (AAA) (HCC)     Liver cyst     Lower back pain     Meniere disease, left     Pancreatic cyst     Renal cyst     Solitary pulmonary nodule         Social History     Socioeconomic History    Marital status:      Spouse name: Not on file    Number of children: Not on file    Years of education: Not on file    Highest education level: Not on file   Occupational History    Not on file   Tobacco Use    Smoking status: Former Smoker     Quit date:      Years since quittin.9    Smokeless tobacco: Never Used   Substance and Sexual Activity    Alcohol use: No    Drug use: No    Sexual activity: Not on file   Other Topics Concern    Not on file   Social History Narrative    Not on file     Social Determinants of Health     Financial Resource Strain:     Difficulty of Paying Living Expenses: Not on file   Food Insecurity:     Worried About Running Out of Food in the Last Year: Not on file    Humza of Food in the Last Year: Not on file   Transportation Needs:     Lack of Transportation (Medical): Not on file    Lack of Transportation (Non-Medical): Not on file   Physical Activity:     Days of Exercise per Week: Not on file    Minutes of Exercise per Session: Not on file   Stress:     Feeling of Stress : Not on file   Social Connections:     Frequency of Communication with Friends and Family: Not on file    Frequency of Social Gatherings with Friends and Family: Not on file    Attends Cheondoism Services: Not on file    Active Member of [address] [place] [address] or Organizations: Not on file    Attends Club or Organization Meetings: Not on file    Marital Status: Not on file   Intimate Partner Violence:     Fear of Current or Ex-Partner: Not on file    Emotionally Abused: Not on file    Physically Abused: Not on file    Sexually Abused: Not on file   Housing Stability:     Unable to Pay for Housing in the Last Year: Not on file    Number of [place] in the Last Year: Not on file    Unstable Housing in the Last Year: Not on file       Current Outpatient Medications   Medication Sig Dispense Refill    lidocaine (LIDODERM) 5 % 1 Patch by TransDERmal route every twenty-four (24) hours. Apply patch to the affected area for 12 hours a day and remove for 12 hours a day.  30 Each 1    methylPREDNISolone (MEDROL DOSEPACK) 4 mg tablet Per dose pack instructions 1 Dose Pack 0    tamsulosin (FLOMAX) 0.4 mg capsule Take 1 Cap by mouth daily (after dinner). 30 Cap 12    gabapentin (NEURONTIN) 300 mg capsule Take 1 Cap by mouth nightly. Max Daily Amount: 300 mg. 90 Cap 1    folic acid/multivit-min/lutein (CENTRUM SILVER PO) Take 1 Tab by Mouth Once a Day.  loratadine (CLARITIN) 10 mg tablet 10 mg.      aluminum & magnesium hydroxide-simethicone (MYLANTA II) 400-400-40 mg/5 mL suspension 30 mL.  nitroglycerin (NITROSTAT) 0.4 mg SL tablet 0.4 mg.      omeprazole (PRILOSEC) 20 mg capsule 20 mg.      umeclidinium-vilanterol (ANORO ELLIPTA) 62.5-25 mcg/actuation inhaler Take 1 Puff by inhalation daily.  acetaminophen (TYLENOL) 500 mg tablet 1,000 mg.  calcium-vitamin D 600 mg(1,500mg) -200 unit tab Take 1 Tab by Mouth Twice Daily.  fluticasone (FLONASE) 50 mcg/actuation nasal spray 2 Sprays.  carvedilol (COREG) 12.5 mg tablet Take  by mouth two (2) times daily (with meals).  furosemide (LASIX) 20 mg tablet Take  by mouth daily.  losartan (COZAAR) 50 mg tablet Take  by mouth daily.  lovastatin (MEVACOR) 40 mg tablet Take 40 mg by mouth nightly.  timolol (TIMOPTIC) 0.5 % ophthalmic solution 1 Drop two (2) times a day.  aspirin 81 mg tablet Take 81 mg by mouth. Allergies   Allergen Reactions    Morphine Itching and Hives         REVIEW OF SYSTEMS    Constitutional: Negative for fever, chills, or weight change. Respiratory: Negative for cough or shortness of breath. Cardiovascular: Negative for chest pain or palpitations. Gastrointestinal: Negative for acid reflux, change in bowel habits, or constipation. Genitourinary: Negative for dysuria and flank pain. Musculoskeletal: Positive for lumbar, right buttock, and right thigh pain. Skin: Negative for rash.    Neurological: Negative for headaches, dizziness, or numbness. Endo/Heme/Allergies: Negative for increased bruising. Psychiatric/Behavioral: Negative for difficulty with sleep. As per HPI    PHYSICAL EXAMINATION  Visit Vitals  BP (!) 141/83   Pulse 78   Temp 97.4 °F (36.3 °C)   Ht 5' 4\" (1.626 m)   Wt 135 lb (61.2 kg)   SpO2 98%   BMI 23.17 kg/m²       Constitutional: Awake, alert, and in no acute distress. Neurological:  Sensation to light touch is intact. Skin: warm, dry, and intact. Musculoskeletal: Tightness across the lower lumbar region. Tenderness to palpation over the right lumbar region and right sacroiliac joint. Pain with right axial loading. No pain with internal or external rotation of her hips. Negative straight leg raise bilaterally. Positive OTILIA test bilaterally (R>L); + Gaenslen's test and + compression test     Hip Flex  Quads Hamstrings Ankle DF EHL Ankle PF   Right +4/5 +4/5 +4/5 +4/5 +4/5 +4/5   Left +4/5 +4/5 +4/5 +4/5 +4/5 +4/5     IMAGING:    Lumbar spine MRI from 12/06/2021 was personally reviewed with the patient and demonstrated:    FINDINGS:     Stable 10 mm anterior spondylolisthesis L4 on L5 and slight retrolisthesis L5/S1. Previous left-sided hemilaminotomy L5/S1 and previous laminotomy L4/5. No evidence of fracture or spondylolysis. No marrow edema or neoplastic marrow signal. Again noted is a low-lying conus at the upper L3 level with an adjacent contiguous oval and slightly lobulated intradural lesion with increased T1 signal and without definite enhancement, measuring maximally 14 mm in cephalocaudad dimension and 8 mm transversely. There is also somewhat thickened appearing filum inferior to this lesion and persistent abnormal peripheral splaying and disc organized appearing cauda equina at the L5 and upper sacral level. There is a complex cystic lesion involving the tail of the pancreas 4 cm in diameter which was described on recent abdomen MRI and not requiring any further follow-up.  There are several benign-appearing hepatic cysts and several small benign-appearing bilateral renal cysts. Mild degenerative spondylosis without significant stenosis lower thoracic spine. Visualized upper sacrum unremarkable. There is 6 cm diameter infrarenal abdominal aortic aneurysm of the native aorta in this patient with previous stent graft, not significant changed in size compared to recent abdominal/pelvic CT.     L1/2 level: Minimal degenerative spondylosis without significant stenosis. L2/3 level: Mild facet arthropathy and minimal degenerative disc changes without significant stenosis. L3/4 level: Stable focal prominent subdural fluid dorsally. There is slight interval increase broad posterior disc protrusion asymmetric to the right with persistent moderate to severe canal stenosis AP diameter canal 6-7 mm very small residual CSF signal surrounding crowded cauda equina. There is severe right lateral recess stenosis with mild left lateral recess stenosis. Stable moderate to severe right-sided and severe left-sided subarticular foraminal stenosis. L4/5 level: Previous hemilaminotomy with severe bilateral facet arthropathy and anterior listhesis with slight progression of moderate canal stenosis with mild to moderate bilateral lateral recess stenosis right greater than left. Persistent moderate to severe left-sided and moderate right-sided subarticular foraminal stenosis. L5/S1 level: Prior left-sided hemilaminotomy with severe disc space narrowing and degenerative spondylosis with mild canal stenosis. Enhancing postop granulation surrounding proximal left S1 nerve root sleeve. Moderate to severe degenerative right lateral recess stenosis. Very severe degenerative right-sided foraminal stenosis. Contrast: There is no other abnormal enhancement. IMPRESSION:      1. Slight interval progression of moderate to severe acquired canal stenosis L3/4 with severe right lateral recess stenosis.  Moderate to severe bilateral foraminal stenosis left greater than right. 2. Slight progression of moderate acquired canal stenosis L4/5 with prior laminotomy, mild to moderate lateral recess stenosis and moderate to severe foraminal stenosis worse on the left. 3. Prior left-sided hemilaminotomy L5/S1 with postop fibrosis surrounding left S1 nerve root sleeve. Moderate to severe right lateral recess and severe degenerative right foraminal stenosis. Mild canal stenosis. 4. 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     Lumbar spine 4V x-rays from 10/28/2021 were personally reviewed with the patient and demonstrated:  Grade1-2 listhesis at L4-5. Severe degenerative disc disease at L5-S1 and L4-5. Multilevel degenerative facet. Aortic graft present.         Cervical spine x-rays from 07/11/2019 [facility]) were personally reviewed and demonstrated:  Bony fusion at C5-6. Multilevel degenerative facets. Anterior osteophyte formation at C6-7. Straightening of the cervical spine.        Written by Xenia aHy, as dictated by Brent Posada MD.  I, Dr. Brent Posada confirm that all documentation is accurate.

## 2021-12-16 NOTE — PATIENT INSTRUCTIONS
Low Back Arthritis: Exercises  Introduction  Here are some examples of typical rehabilitation exercises for your condition. Start each exercise slowly. Ease off the exercise if you start to have pain. Your doctor or physical therapist will tell you when you can start these exercises and which ones will work best for you. When you are not being active, find a comfortable position for rest. Some people are comfortable on the floor or a medium-firm bed with a small pillow under their head and another under their knees. Some people prefer to lie on their side with a pillow between their knees. Don't stay in one position for too long. Take short walks (10 to 20 minutes) every 2 to 3 hours. Avoid slopes, hills, and stairs until you feel better. Walk only distances you can manage without pain, especially leg pain. How to do the exercises  Pelvic tilt    1. Lie on your back with your knees bent. 2. \"Brace\" your stomachtighten your muscles by pulling in and imagining your belly button moving toward your spine. 3. Press your lower back into the floor. You should feel your hips and pelvis rock back. 4. Hold for 6 seconds while breathing smoothly. 5. Relax and allow your pelvis and hips to rock forward. 6. Repeat 8 to 12 times. Back stretches    1. Get down on your hands and knees on the floor. 2. Relax your head and allow it to droop. Round your back up toward the ceiling until you feel a nice stretch in your upper, middle, and lower back. Hold this stretch for as long as it feels comfortable, or about 15 to 30 seconds. 3. Return to the starting position with a flat back while you are on your hands and knees. 4. Let your back sway by pressing your stomach toward the floor. Lift your buttocks toward the ceiling. 5. Hold this position for 15 to 30 seconds. 6. Repeat 2 to 4 times. Follow-up care is a key part of your treatment and safety.  Be sure to make and go to all appointments, and call your doctor if you are having problems. It's also a good idea to know your test results and keep a list of the medicines you take. Where can you learn more? Go to http://www.[employer]ix.com/  Enter T094 in the search box to learn more about \"Low Back Arthritis: Exercises. \"  Current as of: July 1, 2021               Content Version: 13.0  © 2006-2021 [employer]. Care instructions adapted under license by [employer] (which disclaims liability or warranty for this information). If you have questions about a medical condition or this instruction, always ask your healthcare professional. [place] [address] any warranty or liability for your use of this information. Sacroiliac Pain: Exercises  Introduction  Here are some examples of exercises for you to try. The exercises may be suggested for a condition or for rehabilitation. Start each exercise slowly. Ease off the exercises if you start to have pain. You will be told when to start these exercises and which ones will work best for you. How to do the exercises  Knee-to-chest stretch    1. Do not do the knee-to-chest exercise if it causes or increases back or leg pain. 2. Lie on your back with your knees bent and your feet flat on the floor. You can put a small pillow under your head and neck if it is more comfortable. 3. Grasp your hands under one knee and bring the knee to your chest, keeping the other foot flat on the floor. 4. Keep your lower back pressed to the floor. Hold for at least 15 to 30 seconds. 5. Relax and lower the knee to the starting position. Repeat with the other leg. 6. Repeat 2 to 4 times with each leg. 7. To get more stretch, keep your other leg flat on the floor while pulling your knee to your chest.  Bridging    1. Lie on your back with both knees bent. Your knees should be bent about 90 degrees. 2. Tighten your belly muscles by pulling in your belly button toward your spine.  Then push your feet into the floor, squeeze your buttocks, and lift your hips off the floor until your shoulders, hips, and knees are all in a straight line. 3. Hold for about 6 seconds as you continue to breathe normally, and then slowly lower your hips back down to the floor and rest for up to 10 seconds. 4. Repeat 8 to 12 times. Hip extension    1. Get down on your hands and knees on the floor. 2. Keeping your back and neck straight, lift one leg straight out behind you. When you lift your leg, keep your hips level. Don't let your back twist, and don't let your hip drop toward the floor. 3. Hold for 6 seconds. Repeat 8 to 12 times with each leg. 4. If you feel steady and strong when you do this exercise, you can make it more difficult. To do this, when you lift your leg, also lift the opposite arm straight out in front of you. For example, lift the left leg and the right arm at the same time. (This is sometimes called the \"bird dog exercise. \") Hold for 6 seconds, and repeat 8 to 12 times on each side. Clamshell    1. Lie on your side with a pillow under your head. Keep your feet and knees together and your knees bent. 2. Raise your top knee, but keep your feet together. Do not let your hips roll back. Your legs should open up like a clamshell. 3. Hold for 6 seconds. 4. Slowly lower your knee back down. Rest for 10 seconds. 5. Repeat 8 to 12 times. 6. Switch to your other side and repeat steps 1 through 5. Hamstring wall stretch    1. Lie on your back in a doorway, with one leg through the open door. 2. Slide your affected leg up the wall to straighten your knee. You should feel a gentle stretch down the back of your leg. 3. Hold the stretch for at least 1 minute to begin. Then try to lengthen the time you hold the stretch to as long as 6 minutes. 4. Switch legs, and repeat steps 1 through 3.  5. Repeat 2 to 4 times.   6. If you do not have a place to do this exercise in a doorway, there is another way to do it:  7. Lie on your back, and bend one knee. 8. Loop a towel under the ball and toes of that foot, and hold the ends of the towel in your hands. 9. Straighten your knee, and slowly pull back on the towel. You should feel a gentle stretch down the back of your leg. 10. Switch legs, and repeat steps 1 through 3.  11. Repeat 2 to 4 times. 1. Do not arch your back. 2. Do not bend either knee. 3. Keep one heel touching the floor and the other heel touching the wall. Do not point your toes. Lower abdominal strengthening    1. Lie on your back with your knees bent and your feet flat on the floor. 2. Tighten your belly muscles by pulling your belly button in toward your spine. 3. Lift one foot off the floor and bring your knee toward your chest, so that your knee is straight above your hip and your leg is bent like the letter \"L. \"  4. Lift the other knee up to the same position. 5. Lower one leg at a time to the starting position. 6. Keep alternating legs until you have lifted each leg 8 to 12 times. 7. Be sure to keep your belly muscles tight and your back still as you are moving your legs. Be sure to breathe normally. Piriformis stretch    1. Lie on your back with your legs straight. 2. Lift your affected leg, and bend your knee. With your opposite hand, reach across your body, and then gently pull your knee toward your opposite shoulder. 3. Hold the stretch for 15 to 30 seconds. 4. Switch legs and repeat steps 1 through 3.  5. Repeat 2 to 4 times. Follow-up care is a key part of your treatment and safety. Be sure to make and go to all appointments, and call your doctor if you are having problems. It's also a good idea to know your test results and keep a list of the medicines you take. Where can you learn more? Go to http://www.gray.com/  Enter O123 in the search box to learn more about \"Sacroiliac Pain: Exercises. \"  Current as of: July 1, 2021               Content Version: 13.0  © 4249-7962 [employer]. Care instructions adapted under license by [employer] (which disclaims liability or warranty for this information). If you have questions about a medical condition or this instruction, always ask your healthcare professional. [address] any warranty or liability for your use of this information.

## 2021-12-20 ENCOUNTER — TELEPHONE (OUTPATIENT)
Dept: ORTHOPEDIC SURGERY | Age: 81
End: 2021-12-20

## 2021-12-21 NOTE — TELEPHONE ENCOUNTER
Contact CoverMyMeds for PA for lidocaine 5% patches. Per Bernabe Ortega  Rx sent 12/16/21 30 + 1    Login in to [email or web address]    LACEY: Miladys Leblanc
PA sent to plan
Speaking Coherently

## 2022-01-05 ENCOUNTER — HOSPITAL ENCOUNTER (OUTPATIENT)
Age: 82
Setting detail: OUTPATIENT SURGERY
Discharge: HOME | End: 2022-01-05
Attending: PHYSICAL MEDICINE & REHABILITATION | Admitting: PHYSICAL MEDICINE & REHABILITATION
Payer: MEDICARE

## 2022-01-05 ENCOUNTER — APPOINTMENT (OUTPATIENT)
Dept: GENERAL RADIOLOGY | Age: 82
End: 2022-01-05
Attending: PHYSICAL MEDICINE & REHABILITATION
Payer: MEDICARE

## 2022-01-05 VITALS
RESPIRATION RATE: 14 BRPM | HEART RATE: 87 BPM | SYSTOLIC BLOOD PRESSURE: 158 MMHG | TEMPERATURE: 98.4 F | DIASTOLIC BLOOD PRESSURE: 87 MMHG | OXYGEN SATURATION: 95 %

## 2022-01-05 DIAGNOSIS — M53.3 SACROILIAC JOINT PAIN: ICD-10-CM

## 2022-01-05 PROCEDURE — 74011000250 HC RX REV CODE- 250: Performed by: PHYSICAL MEDICINE & REHABILITATION

## 2022-01-05 PROCEDURE — 76010000009 HC PAIN MGT 0 TO 30 MIN PROC: Performed by: PHYSICAL MEDICINE & REHABILITATION

## 2022-01-05 PROCEDURE — 77030039433 HC TY MYLEOGRAM BD -B: Performed by: PHYSICAL MEDICINE & REHABILITATION

## 2022-01-05 PROCEDURE — 74011250636 HC RX REV CODE- 250/636: Performed by: PHYSICAL MEDICINE & REHABILITATION

## 2022-01-05 PROCEDURE — 27096 INJECT SACROILIAC JOINT: CPT | Performed by: PHYSICAL MEDICINE & REHABILITATION

## 2022-01-05 PROCEDURE — 74011000636 HC RX REV CODE- 636: Performed by: PHYSICAL MEDICINE & REHABILITATION

## 2022-01-05 PROCEDURE — 2709999900 HC NON-CHARGEABLE SUPPLY: Performed by: PHYSICAL MEDICINE & REHABILITATION

## 2022-01-05 PROCEDURE — 74011250637 HC RX REV CODE- 250/637: Performed by: PHYSICAL MEDICINE & REHABILITATION

## 2022-01-05 PROCEDURE — 77030003676 HC NDL SPN MPRI -A: Performed by: PHYSICAL MEDICINE & REHABILITATION

## 2022-01-05 RX ORDER — DIAZEPAM 5 MG/1
5-20 TABLET ORAL ONCE
Status: COMPLETED | OUTPATIENT
Start: 2022-01-05 | End: 2022-01-05

## 2022-01-05 RX ORDER — DEXAMETHASONE SODIUM PHOSPHATE 100 MG/10ML
INJECTION INTRAMUSCULAR; INTRAVENOUS AS NEEDED
Status: DISCONTINUED | OUTPATIENT
Start: 2022-01-05 | End: 2022-01-05 | Stop reason: HOSPADM

## 2022-01-05 RX ORDER — LIDOCAINE HYDROCHLORIDE 10 MG/ML
INJECTION, SOLUTION EPIDURAL; INFILTRATION; INTRACAUDAL; PERINEURAL AS NEEDED
Status: DISCONTINUED | OUTPATIENT
Start: 2022-01-05 | End: 2022-01-05 | Stop reason: HOSPADM

## 2022-01-05 RX ADMIN — DIAZEPAM 5 MG: 5 TABLET ORAL at 14:00

## 2022-01-05 NOTE — PROCEDURES
Procedure Note    Patient Name: Susanne Omalley    Date of Procedure: January 5, 2022    Preoperative Diagnosis: Sacroiliac Joint Dysfunction    Post Operative Diagnosis: same    Procedure: SI Joint Injection right     Consent: Informed consent was obtained prior to the procedure. The patient was given the opportunity to ask questions regarding the procedure and its associated risks. In addition to the potential risks associated with the procedure itself, the patient was informed both verbally and in writing of potential side effects of the use of glucocorticoids. The patient appeared to comprehend the informed consent and desired to have the procedure performed. Procedure: The patient was placed in the prone position on the flouroscopy table and the back was prepped and draped in the usual sterile manner. A #22 gauge spinal needle was then advanced to lie within the SI joint after local Lidocaine 1% injection. 1 cc isovue used to confirm the position. A total of 10 mg of preservative free dexamethasone and 5 cc of Lidocaine was introduced into and around the SI joint. The injection area was cleaned and bandaids applied. No excessive bleeding was noted. Patient dressed and was discharged to home with instructions. Discussion: The patient tolerated the procedure well.      Cheryl Morales MD  January 5, 2022

## 2022-01-05 NOTE — PERIOP NOTES
Patient tolerated procedure well. No complications noted. VSS. No redness, swelling, or bleeding from injection site. Dressing dry and intact. Armband removed and shredded. Patient wheeled  to main entrance by RN and discharged alive and well, in stable condition.

## 2022-01-05 NOTE — H&P
Date of Surgery Update:  Erma Lima was seen and examined. History and physical has been reviewed. The patient has been examined. There have been no significant clinical changes since the last office visit. The patient was counseled at length about the risks of barrington Covid-19 during their perioperative period and any recovery window from their procedure. The patient was made aware that barrington Covid-19  may worsen their prognosis for recovering from their procedure and lend to a higher morbidity and/or mortality risk. All material risks, benefits, and reasonable alternatives including postponing the procedure were discussed. The patient does  wish to proceed with the procedure at this time.     Pre Injection pain level:  7/10  Post Injection pain level:  5 /10        Signed By: Riddhi Renteria MD     January 5, 2022 2:07 PM

## 2022-01-05 NOTE — DISCHARGE INSTRUCTIONS
[facility]   ([facility])  Dr. Jena Licea, Dr. Raul Gonzalez, Dr. Ryanne Stephens Spinal Procedure (Block) Instructions    * Do not drive a car, operate heavy machinery or dangerous equipment, or make important decisions for 12-24 hours. * Light activity as tolerated; may rest for the remainder of the day. * Resume pre-block medications including those from your other doctors. * Do not drink alcoholic beverages for 24 hours. Alcohol and the medications you have received may interact and cause an adverse reaction. * You may feel better this evening and worse tomorrow, as the numbing medications wears off and the steroid has yet to begin to work. After 48-72 hrs the steroid should begin to release bringing you relief. If you had a medial branch block, no steroids were used. The medial branch block is a test to see if you are a candidate for radiofrequency ablation (RFA). The anesthetic (numbing medicine)  will wear off by the next day. * You may shower this evening and remove any bandages. * Avoid hot tubs/pools/tub soaks and heating pads for 24 hours. You may use cold packs on the procedure site as tolerated for the first 24 hours. * If a headache develops, drink plenty of fluids and rest.  Take over the counter medications for headache if needed. If the headache continues longer than 24 hours, call MD at the [address] [place] [address] Avenue. [phone]    * Continue taking pain medications as needed. * You may resume your regular diet if tolerated. Otherwise, start with sips of water and advance slowly. * If Diabetic: check your blood sugar three times a day for the next 3 days. If your sugar is greater than 300 call your family doctor. If your sugar is greater than 400, have someone transport you to the nearest Emergency Room. * If you experience any of the following problems, Please Call the [address] [place] [address] Avenue at [phone].         * Excessive pain, swelling, redness or odor at or around the surgical area    * Fever of 101 or higher    * Nausea / Vomiting lasting longer than 4 hours or if unable to take medications. * Severe Headache    * Weakness or numbness in arms or legs that is not      resolving   * Any NEW signs of decreased circulation or nerve impairment in leg: change in color, swelling, persistent numbness, tingling                    * Prolonged increase in pain greater than 4 days      PATIENT INSTRUCTIONS:    After oral sedation, for 12-24 hours or while taking prescription Narcotics:  · Limit your activities  · Do not drive and operate hazardous machinery  · Do not make important personal or business decisions  · Do  not drink alcoholic beverages  · If you have not urinated within 8 hours after discharge, please contact your surgeon on call. *  Please give a list of your current medications to your Primary Care Provider. *  Please update this list whenever your medications are discontinued, doses are      changed, or new medications (including over-the-counter products) are added. *  Please carry medication information at all times in case of emergency situations. These are general instructions for a healthy lifestyle:    No smoking/ No tobacco products/ Avoid exposure to second hand smoke    Surgeon General's Warning:  Quitting smoking now greatly reduces serious risk to your health. Obesity, smoking, and sedentary lifestyle greatly increases your risk for illness    A healthy diet, regular physical exercise & weight monitoring are important for maintaining a healthy lifestyle    You may be retaining fluid if you have a history of heart failure or if you experience any of the following symptoms:  Weight gain of 3 pounds or more overnight or 5 pounds in a week, increased swelling in our hands or feet or shortness of breath while lying flat in bed.   Please call your doctor as soon as you notice any of these symptoms; do not wait until your next office visit. Recognize signs and symptoms of STROKE:    F-face looks uneven    A-arms unable to move or move unevenly    S-speech slurred or non-existent    T-time-call 911 as soon as signs and symptoms begin-DO NOT go       Back to bed or wait to see if you get better-TIME IS BRAIN.

## 2022-02-17 ENCOUNTER — OFFICE VISIT (OUTPATIENT)
Dept: ORTHOPEDIC SURGERY | Age: 82
End: 2022-02-17
Payer: MEDICARE

## 2022-02-17 VITALS
WEIGHT: 142 LBS | HEIGHT: 64 IN | OXYGEN SATURATION: 96 % | HEART RATE: 83 BPM | BODY MASS INDEX: 24.24 KG/M2 | TEMPERATURE: 97.4 F

## 2022-02-17 DIAGNOSIS — M53.3 PAIN OF RIGHT SACROILIAC JOINT: ICD-10-CM

## 2022-02-17 DIAGNOSIS — M62.838 MUSCLE SPASM: ICD-10-CM

## 2022-02-17 DIAGNOSIS — R26.9 GAIT ABNORMALITY: ICD-10-CM

## 2022-02-17 DIAGNOSIS — Z86.16 PERSONAL HISTORY OF COVID-19: ICD-10-CM

## 2022-02-17 DIAGNOSIS — M47.816 LUMBAR FACET ARTHROPATHY: ICD-10-CM

## 2022-02-17 DIAGNOSIS — R29.898 RIGHT LEG WEAKNESS: ICD-10-CM

## 2022-02-17 DIAGNOSIS — M48.062 SPINAL STENOSIS OF LUMBAR REGION WITH NEUROGENIC CLAUDICATION: Primary | ICD-10-CM

## 2022-02-17 PROCEDURE — G8536 NO DOC ELDER MAL SCRN: HCPCS | Performed by: PHYSICAL MEDICINE & REHABILITATION

## 2022-02-17 PROCEDURE — 99214 OFFICE O/P EST MOD 30 MIN: CPT | Performed by: PHYSICAL MEDICINE & REHABILITATION

## 2022-02-17 PROCEDURE — G8427 DOCREV CUR MEDS BY ELIG CLIN: HCPCS | Performed by: PHYSICAL MEDICINE & REHABILITATION

## 2022-02-17 PROCEDURE — 1090F PRES/ABSN URINE INCON ASSESS: CPT | Performed by: PHYSICAL MEDICINE & REHABILITATION

## 2022-02-17 PROCEDURE — G8400 PT W/DXA NO RESULTS DOC: HCPCS | Performed by: PHYSICAL MEDICINE & REHABILITATION

## 2022-02-17 PROCEDURE — 1101F PT FALLS ASSESS-DOCD LE1/YR: CPT | Performed by: PHYSICAL MEDICINE & REHABILITATION

## 2022-02-17 PROCEDURE — G8432 DEP SCR NOT DOC, RNG: HCPCS | Performed by: PHYSICAL MEDICINE & REHABILITATION

## 2022-02-17 PROCEDURE — G8420 CALC BMI NORM PARAMETERS: HCPCS | Performed by: PHYSICAL MEDICINE & REHABILITATION

## 2022-02-17 RX ORDER — PREDNISONE 5 MG/1
TABLET ORAL
Qty: 45 TABLET | Refills: 0 | Status: SHIPPED | OUTPATIENT
Start: 2022-02-17

## 2022-02-17 RX ORDER — PREDNISONE 10 MG/1
TABLET ORAL
Qty: 11 TABLET | Refills: 0 | Status: CANCELLED | OUTPATIENT
Start: 2022-02-17

## 2022-02-17 RX ORDER — PREDNISONE 10 MG/1
TABLET ORAL
Qty: 45 TABLET | Refills: 0 | Status: CANCELLED | OUTPATIENT
Start: 2022-02-17

## 2022-02-17 NOTE — PATIENT INSTRUCTIONS
Low Back Arthritis: Exercises  Introduction  Here are some examples of typical rehabilitation exercises for your condition. Start each exercise slowly. Ease off the exercise if you start to have pain. Your doctor or physical therapist will tell you when you can start these exercises and which ones will work best for you. When you are not being active, find a comfortable position for rest. Some people are comfortable on the floor or a medium-firm bed with a small pillow under their head and another under their knees. Some people prefer to lie on their side with a pillow between their knees. Don't stay in one position for too long. Take short walks (10 to 20 minutes) every 2 to 3 hours. Avoid slopes, hills, and stairs until you feel better. Walk only distances you can manage without pain, especially leg pain. How to do the exercises  Pelvic tilt    1. Lie on your back with your knees bent. 2. \"Brace\" your stomachtighten your muscles by pulling in and imagining your belly button moving toward your spine. 3. Press your lower back into the floor. You should feel your hips and pelvis rock back. 4. Hold for 6 seconds while breathing smoothly. 5. Relax and allow your pelvis and hips to rock forward. 6. Repeat 8 to 12 times. Back stretches    1. Get down on your hands and knees on the floor. 2. Relax your head and allow it to droop. Round your back up toward the ceiling until you feel a nice stretch in your upper, middle, and lower back. Hold this stretch for as long as it feels comfortable, or about 15 to 30 seconds. 3. Return to the starting position with a flat back while you are on your hands and knees. 4. Let your back sway by pressing your stomach toward the floor. Lift your buttocks toward the ceiling. 5. Hold this position for 15 to 30 seconds. 6. Repeat 2 to 4 times. Follow-up care is a key part of your treatment and safety.  Be sure to make and go to all appointments, and call your doctor if you are having problems. It's also a good idea to know your test results and keep a list of the medicines you take. Where can you learn more? Go to http://www.[employer].com/  Enter T094 in the search box to learn more about \"Low Back Arthritis: Exercises. \"  Current as of: July 1, 2021               Content Version: 13.0  © 2006-2021 [employer]. Care instructions adapted under license by [employer] (which disclaims liability or warranty for this information). If you have questions about a medical condition or this instruction, always ask your healthcare professional. [place] [address] any warranty or liability for your use of this information. Sacroiliac Pain: Exercises  Introduction  Here are some examples of exercises for you to try. The exercises may be suggested for a condition or for rehabilitation. Start each exercise slowly. Ease off the exercises if you start to have pain. You will be told when to start these exercises and which ones will work best for you. How to do the exercises  Knee-to-chest stretch    1. Do not do the knee-to-chest exercise if it causes or increases back or leg pain. 2. Lie on your back with your knees bent and your feet flat on the floor. You can put a small pillow under your head and neck if it is more comfortable. 3. Grasp your hands under one knee and bring the knee to your chest, keeping the other foot flat on the floor. 4. Keep your lower back pressed to the floor. Hold for at least 15 to 30 seconds. 5. Relax and lower the knee to the starting position. Repeat with the other leg. 6. Repeat 2 to 4 times with each leg. 7. To get more stretch, keep your other leg flat on the floor while pulling your knee to your chest.  Bridging    1. Lie on your back with both knees bent. Your knees should be bent about 90 degrees. 2. Tighten your belly muscles by pulling in your belly button toward your spine.  Then push your feet into the floor, squeeze your buttocks, and lift your hips off the floor until your shoulders, hips, and knees are all in a straight line. 3. Hold for about 6 seconds as you continue to breathe normally, and then slowly lower your hips back down to the floor and rest for up to 10 seconds. 4. Repeat 8 to 12 times. Hip extension    1. Get down on your hands and knees on the floor. 2. Keeping your back and neck straight, lift one leg straight out behind you. When you lift your leg, keep your hips level. Don't let your back twist, and don't let your hip drop toward the floor. 3. Hold for 6 seconds. Repeat 8 to 12 times with each leg. 4. If you feel steady and strong when you do this exercise, you can make it more difficult. To do this, when you lift your leg, also lift the opposite arm straight out in front of you. For example, lift the left leg and the right arm at the same time. (This is sometimes called the \"bird dog exercise. \") Hold for 6 seconds, and repeat 8 to 12 times on each side. Clamshell    1. Lie on your side with a pillow under your head. Keep your feet and knees together and your knees bent. 2. Raise your top knee, but keep your feet together. Do not let your hips roll back. Your legs should open up like a clamshell. 3. Hold for 6 seconds. 4. Slowly lower your knee back down. Rest for 10 seconds. 5. Repeat 8 to 12 times. 6. Switch to your other side and repeat steps 1 through 5. Hamstring wall stretch    1. Lie on your back in a doorway, with one leg through the open door. 2. Slide your affected leg up the wall to straighten your knee. You should feel a gentle stretch down the back of your leg. 3. Hold the stretch for at least 1 minute to begin. Then try to lengthen the time you hold the stretch to as long as 6 minutes. 4. Switch legs, and repeat steps 1 through 3.  5. Repeat 2 to 4 times.   6. If you do not have a place to do this exercise in a doorway, there is another way to do it:  7. Lie on your back, and bend one knee. 8. Loop a towel under the ball and toes of that foot, and hold the ends of the towel in your hands. 9. Straighten your knee, and slowly pull back on the towel. You should feel a gentle stretch down the back of your leg. 10. Switch legs, and repeat steps 1 through 3.  11. Repeat 2 to 4 times. 1. Do not arch your back. 2. Do not bend either knee. 3. Keep one heel touching the floor and the other heel touching the wall. Do not point your toes. Lower abdominal strengthening    1. Lie on your back with your knees bent and your feet flat on the floor. 2. Tighten your belly muscles by pulling your belly button in toward your spine. 3. Lift one foot off the floor and bring your knee toward your chest, so that your knee is straight above your hip and your leg is bent like the letter \"L. \"  4. Lift the other knee up to the same position. 5. Lower one leg at a time to the starting position. 6. Keep alternating legs until you have lifted each leg 8 to 12 times. 7. Be sure to keep your belly muscles tight and your back still as you are moving your legs. Be sure to breathe normally. Piriformis stretch    1. Lie on your back with your legs straight. 2. Lift your affected leg, and bend your knee. With your opposite hand, reach across your body, and then gently pull your knee toward your opposite shoulder. 3. Hold the stretch for 15 to 30 seconds. 4. Switch legs and repeat steps 1 through 3.  5. Repeat 2 to 4 times. Follow-up care is a key part of your treatment and safety. Be sure to make and go to all appointments, and call your doctor if you are having problems. It's also a good idea to know your test results and keep a list of the medicines you take. Where can you learn more? Go to http://www.[employer].com/  Enter S391 in the search box to learn more about \"Sacroiliac Pain: Exercises. \"  Current as of: July 1, 2021               Content Version: 13.0  © 8708-7524 [employer]. Care instructions adapted under license by [employer] (which disclaims liability or warranty for this information). If you have questions about a medical condition or this instruction, always ask your healthcare professional. [address] any warranty or liability for your use of this information. Lumbar Spinal Stenosis: Care Instructions  Your Care Instructions     Stenosis in the spine is a narrowing of the canal that is around the spinal cord and nerve roots in your back. It can happen as part of aging. Sometimes bone and other tissue grow into this canal and press on the nerves that branch out from the spinal cord. This can cause pain, numbness, and weakness. When it happens in the lower part of your back, it is called lumbar spinal stenosis. It can cause problems in the legs, feet, and rear end (buttocks). You may be able to get relief from the symptoms of spinal stenosis by taking pain medicine. Your doctor may suggest physical therapy and exercises to keep your spine strong and flexible. Some people try steroid shots to reduce swelling. If pain and numbness in your legs are still so bad that you cannot do your normal activities, you may need surgery. Follow-up care is a key part of your treatment and safety. Be sure to make and go to all appointments, and call your doctor if you are having problems. It's also a good idea to know your test results and keep a list of the medicines you take. How can you care for yourself at home? · Take an over-the-counter pain medicine. Nonsteroidal anti-inflammatory drugs (NSAIDs) such as ibuprofen or naproxen seem to work best. But if you can't take NSAIDs, you can try acetaminophen. Be safe with medicines. Read and follow all instructions on the label. · Do not take two or more pain medicines at the same time unless the doctor told you to.  Many pain medicines have acetaminophen, which is Tylenol. Too much acetaminophen (Tylenol) can be harmful. · Stay at a healthy weight. Being overweight puts extra strain on your spine. · Change positions often when you sit or stand. This can ease pain. It may also reduce pressure on the spinal cord and its nerves. · Avoid doing things that make your symptoms worse. Walking downhill and standing for a long time may cause pain. · Stretch and strengthen your back muscles as your doctor or physical therapist recommends. If your doctor says it is okay to do them, these exercises may help. ? Lie on your back with your knees bent. Gently pull one bent knee to your chest. Put that foot back on the floor, and then pull the other knee to your chest.  ? Do pelvic tilts. Lie on your back with your knees bent. Tighten your stomach muscles. Pull your belly button (navel) in and up toward your ribs. You should feel like your back is pressing to the floor and your hips and pelvis are slightly lifting off the floor. Hold for 6 seconds while breathing smoothly. ? Stand with your back flat against a wall. Slowly slide down until your knees are slightly bent. Hold for 10 seconds, then slide back up the wall. · Remove or change anything in your house that may cause you to fall. Keep walkways clear of clutter, electrical cords, and throw rugs. When should you call for help? Call 911 anytime you think you may need emergency care. For example, call if:    · You are unable to move a leg at all. Call your doctor now or seek immediate medical care if:    · You have new or worse symptoms in your legs, belly, or buttocks. Symptoms may include:  ? Numbness or tingling. ? Weakness. ? Pain.     · You lose bladder or bowel control. Watch closely for changes in your health, and be sure to contact your doctor if:    · You have a fever, lose weight, or don't feel well.     · You are not getting better as expected. Where can you learn more?   Go to http://www.gray.com/  Enter [id number] in the search box to learn more about \"Lumbar Spinal Stenosis: Care Instructions. \"  Current as of: July 1, 2021               Content Version: 13.0  © 9655-7491 [employer], [facility]. Care instructions adapted under license by [employer] (which disclaims liability or warranty for this information). If you have questions about a medical condition or this instruction, always ask your healthcare professional. [address] any warranty or liability for your use of this information.

## 2022-02-17 NOTE — PROGRESS NOTES
[facility]  [address] [facility], [facility]   Phone: [phone]  Fax: [phone]    Pt's YOB: 1940    ASSESSMENT   Diagnoses and all orders for this visit:    1. Spinal stenosis of lumbar region with neurogenic claudication    2. Right leg weakness    3. Pain of right sacroiliac joint  -     predniSONE (DELTASONE) 5 mg tablet; 2 tabs in the morning with food x 15 days then 1 po daily    4. Lumbar facet arthropathy  -     predniSONE (DELTASONE) 5 mg tablet; 2 tabs in the morning with food x 15 days then 1 po daily    5. Muscle spasm    6. Gait abnormality    7. Personal history of COVID-19         IMPRESSION AND PLAN:  Shraddha Alas is a 79 y.o. female with history of cervical and lower lumbar pain. Pt complains of feelings of instability and bone slipping in the right sacroiliac region with sitting down and continued right lumbar pain radiating down the bilateral legs. Pt is taking Neurontin 300 mg 1 cap QHS with improvement in sleep and without morning sedation. 1) Pt was given information on lumbar arthritis and sacroiliac exercises. 2) Pt's lumbar MRI report from 12/06/2021 was reviewed with the pt. Pt was given information on lumbar spinal stenosis. 3) Discussed treatment options with the patient including physical therapy, durable medical equipment, medications, lumbar steroid injections, and sacroiliac steroid injections. 4) Pt was prescribed prednisone 5 mg 2 tabs daily with food for 15 days, then decreasing to 1 tab daily. 5) A right S3 SNRB was ordered; pt will call to confirm pending results of prednisone. 6) I encouraged the patient to exercise regularly, working up to 30 minutes a day, 5 days a week. I recommended that she continue with exercises learned from physical therapy and use a seated pedal exerciser for improvement in her leg strength.   7) I recommended the pt ambulate with the assistance of a cane on the left, walking sticks, or a walker due to her right leg weakness. 8) Ms. Ayo Padilla has a reminder for a \"due or due soon\" health maintenance. I have asked that she contact her primary care provider, Eva Novak MD, for follow-up on this health maintenance. 9)  demonstrated consistency with prescribing. Follow-up and Dispositions    · Return in about 6 weeks (around 3/31/2022) for Medication follow up. HISTORY OF PRESENT ILLNESS:  Erma Lima is a 79 y.o. female with history of cervical and lower lumbar pain and presents to the office today for MRI follow up. Pt complains of feelings of instability and bone slipping in the right sacroiliac region with sitting down, continued right lumbar pain radiating down the bilateral legs, and bilateral leg weakness. She notes that her pain is exacerbated by bending forward. Pt denies any leg numbness, tingling, or burning pain. However, she notes that she walks off-balance and drags her right leg. Pt also notes that her radiating left leg pain has improved since her last office visit. However, she further notes that she likes to stay active with cleaning and yard work but her pain has begun to limit her activity. She notes recently undergoing home physical therapy for her back without benefit and states that she has not continued with home exercises since completing therapy 3 weeks ago. Pt is taking Neurontin 300 mg 1 cap QHS with improvement in sleep and without morning sedation and aspirin 81 mg daily. She notes that she previously took 300 mg 2 caps QHS with morning sedation and reports significant sedation when taking Neurontin during the day. Pt states that she does not recall any significant side effects with taking prednisone. She lso notes minor relief with a right sacroiliac block administered on 01/05/2022. Pt further confirms following up with Dr. Stephen Patel regarding her spinal cord. She also confirms a prior L4-5 spinal surgery.  Pt confirms that she was previously admitted to [facility] in 2021 due to pancreatic issues. She states that she feels her strength has not yet fully returned. Pt at this time desires to proceed with medication evaluation. More than 30 minutes was spent with the patient and her son extensively discussing her symptoms, previous therapy, injections, medications and current treatment plan. Pain Scale: 7/10    PCP: Aneta Morse MD     Past Medical History:   Diagnosis Date    AAA (abdominal aortic aneurysm) ([facility] [address]     Adrenal cyst (HCC)     Arthritis     Asthma     Degenerative joint disease (DJD) of lumbar spine     DJD (degenerative joint disease) of cervical spine     BORIS (generalized anxiety disorder)     GERD (gastroesophageal reflux disease)     HLD (hyperlipidemia)     Hypertension     Kidney lesion, native, left     Leaking abdominal aortic aneurysm (AAA) (HCC)     Liver cyst     Lower back pain     Meniere disease, left     Pancreatic cyst     Renal cyst     Solitary pulmonary nodule         Social History     Socioeconomic History    Marital status:      Spouse name: Not on file    Number of children: Not on file    Years of education: Not on file    Highest education level: Not on file   Occupational History    Not on file   Tobacco Use    Smoking status: Former Smoker     Quit date:      Years since quittin.1    Smokeless tobacco: Never Used   Substance and Sexual Activity    Alcohol use: No    Drug use: No    Sexual activity: Not on file   Other Topics Concern    Not on file   Social History Narrative    Not on file     Social Determinants of Health     Financial Resource Strain:     Difficulty of Paying Living Expenses: Not on file   Food Insecurity:     Worried About Running Out of Food in the Last Year: Not on file    Humza of Food in the Last Year: Not on file   Transportation Needs:     Lack of Transportation (Medical):  Not on file    Lack of Transportation (Non-Medical): Not on file   Physical Activity:     Days of Exercise per Week: Not on file    Minutes of Exercise per Session: Not on file   Stress:     Feeling of Stress : Not on file   Social Connections:     Frequency of Communication with Friends and Family: Not on file    Frequency of Social Gatherings with Friends and Family: Not on file    Attends Anab[employer]ist Services: Not on file    Active Member of [address] [place] [address] [employer] or Organizations: Not on file    Attends Club or Organization Meetings: Not on file    Marital Status: Not on file   Intimate Partner Violence:     Fear of Current or Ex-Partner: Not on file    Emotionally Abused: Not on file    Physically Abused: Not on file    Sexually Abused: Not on file   Housing Stability:     Unable to Pay for Housing in the Last Year: Not on file    Number of [place] in the Last Year: Not on file    Unstable Housing in the Last Year: Not on file       Current Outpatient Medications   Medication Sig Dispense Refill    predniSONE (DELTASONE) 5 mg tablet 2 tabs in the morning with food x 15 days then 1 po daily 45 Tablet 0    lidocaine (LIDODERM) 5 % 1 Patch by TransDERmal route every twenty-four (24) hours. Apply patch to the affected area for 12 hours a day and remove for 12 hours a day. 30 Each 1    methylPREDNISolone (MEDROL DOSEPACK) 4 mg tablet Per dose pack instructions 1 Dose Pack 0    tamsulosin (FLOMAX) 0.4 mg capsule Take 1 Cap by mouth daily (after dinner). 30 Cap 12    gabapentin (NEURONTIN) 300 mg capsule Take 1 Cap by mouth nightly. Max Daily Amount: 300 mg. 90 Cap 1    folic acid/multivit-min/lutein (CENTRUM SILVER PO) Take 1 Tab by Mouth Once a Day.  loratadine (CLARITIN) 10 mg tablet 10 mg.      aluminum & magnesium hydroxide-simethicone (MYLANTA II) 400-400-40 mg/5 mL suspension 30 mL.       nitroglycerin (NITROSTAT) 0.4 mg SL tablet 0.4 mg.      omeprazole (PRILOSEC) 20 mg capsule 20 mg.     Margo Alvarez umeclidinium-vilanterol (ANORO ELLIPTA) 62.5-25 mcg/actuation inhaler Take 1 Puff by inhalation daily.  acetaminophen (TYLENOL) 500 mg tablet 1,000 mg.  calcium-vitamin D 600 mg(1,500mg) -200 unit tab Take 1 Tab by Mouth Twice Daily.  fluticasone (FLONASE) 50 mcg/actuation nasal spray 2 Sprays.  carvedilol (COREG) 12.5 mg tablet Take  by mouth two (2) times daily (with meals).  furosemide (LASIX) 20 mg tablet Take  by mouth daily.  losartan (COZAAR) 50 mg tablet Take  by mouth daily.  lovastatin (MEVACOR) 40 mg tablet Take 40 mg by mouth nightly.  timolol (TIMOPTIC) 0.5 % ophthalmic solution 1 Drop two (2) times a day.  aspirin 81 mg tablet Take 81 mg by mouth. Allergies   Allergen Reactions    Morphine Itching and Hives         REVIEW OF SYSTEMS    Constitutional: Negative for fever, chills, or weight change. Respiratory: Negative for cough or shortness of breath. Cardiovascular: Negative for chest pain or palpitations. Gastrointestinal: Negative for acid reflux, change in bowel habits, or constipation. Genitourinary: Negative for dysuria and flank pain. Musculoskeletal: Positive for lumbar and right leg pain. Positive for bilateral leg weakness. Skin: Negative for rash. Neurological: Negative for headaches, dizziness, or numbness. Endo/Heme/Allergies: Negative for increased bruising. Psychiatric/Behavioral: Negative for difficulty with sleep. As per HPI    PHYSICAL EXAMINATION  Visit Vitals  Pulse 83   Temp 97.4 °F (36.3 °C)   Ht 5' 4\" (1.626 m)   Wt 142 lb (64.4 kg)   SpO2 96%   BMI 24.37 kg/m²       Constitutional: Awake, alert, and in no acute distress. Neurological:  Sensation to light touch is intact. Skin: warm, dry, and intact. Musculoskeletal: Tenderness to palpation over the right lower lumbar region. Mild-moderate pain with extension, axial loading, and with forward flexion.  No pain with internal or external rotation of her hips. Positive straight leg raise on the right. Hip Flex  Quads Hamstrings Ankle DF EHL Ankle PF   Right -3/5 4/5 4/5 4/5 4/5 4/5   Left +4/5 +4/5 +4/5 +4/5 +4/5 +4/5     IMAGING:    Lumbar spine MRI from 12/06/2021 was personally reviewed with the patient and demonstrated:    FINDINGS:     Stable 10 mm anterior spondylolisthesis L4 on L5 and slight retrolisthesis L5/S1. Previous left-sided hemilaminotomy L5/S1 and previous laminotomy L4/5. No evidence of fracture or spondylolysis. No marrow edema or neoplastic marrow signal. Again noted is a low-lying conus at the upper L3 level with an adjacent contiguous oval and slightly lobulated intradural lesion with increased T1 signal and without definite enhancement, measuring maximally 14 mm in cephalocaudad dimension and 8 mm transversely. There is also somewhat thickened appearing filum inferior to this lesion and persistent abnormal peripheral splaying and disc organized appearing cauda equina at the L5 and upper sacral level. There is a complex cystic lesion involving the tail of the pancreas 4 cm in diameter which was described on recent abdomen MRI and not requiring any further follow-up. There are several benign-appearing hepatic cysts and several small benign-appearing bilateral renal cysts. Mild degenerative spondylosis without significant stenosis lower thoracic spine. Visualized upper sacrum unremarkable. There is 6 cm diameter infrarenal abdominal aortic aneurysm of the native aorta in this patient with previous stent graft, not significant changed in size compared to recent abdominal/pelvic CT.     L1/2 level: Minimal degenerative spondylosis without significant stenosis. L2/3 level: Mild facet arthropathy and minimal degenerative disc changes without significant stenosis. L3/4 level: Stable focal prominent subdural fluid dorsally.  There is slight interval increase broad posterior disc protrusion asymmetric to the right with persistent moderate to severe canal stenosis AP diameter canal 6-7 mm very small residual CSF signal surrounding crowded cauda equina. There is severe right lateral recess stenosis with mild left lateral recess stenosis. Stable moderate to severe right-sided and severe left-sided subarticular foraminal stenosis. L4/5 level: Previous hemilaminotomy with severe bilateral facet arthropathy and anterior listhesis with slight progression of moderate canal stenosis with mild to moderate bilateral lateral recess stenosis right greater than left. Persistent moderate to severe left-sided and moderate right-sided subarticular foraminal stenosis. L5/S1 level: Prior left-sided hemilaminotomy with severe disc space narrowing and degenerative spondylosis with mild canal stenosis. Enhancing postop granulation surrounding proximal left S1 nerve root sleeve. Moderate to severe degenerative right lateral recess stenosis. Very severe degenerative right-sided foraminal stenosis. Contrast: There is no other abnormal enhancement.       _______________     IMPRESSION       1. Slight interval progression of moderate to severe acquired canal stenosis L3/4 with severe right lateral recess stenosis. Moderate to severe bilateral foraminal stenosis left greater than right. 2. Slight progression of moderate acquired canal stenosis L4/5 with prior laminotomy, mild to moderate lateral recess stenosis and moderate to severe foraminal stenosis worse on the left. 3. Prior left-sided hemilaminotomy L5/S1 with postop fibrosis surrounding left S1 nerve root sleeve. Moderate to severe right lateral recess and severe degenerative right foraminal stenosis. Mild canal stenosis. 4. 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       Lumbar spine 4V x-rays from 10/28/2021 were personally reviewed with the patient and demonstrated:  Grade1-2 listhesis at L4-5. Severe degenerative disc disease at L5-S1 and L4-5. Multilevel degenerative facet. Aortic graft present.       Cervical spine x-rays from 07/11/2019 ) were personally reviewed and demonstrated:  Bony fusion at C5-6. Multilevel degenerative facets. Anterior osteophyte formation at C6-7. Straightening of the cervical spine.      Written by Darlene Olea, as dictated by Isa Goncalves MD.  I, Dr. Isa Goncalves confirm that all documentation is accurate.

## 2022-03-31 ENCOUNTER — OFFICE VISIT (OUTPATIENT)
Dept: ORTHOPEDIC SURGERY | Age: 82
End: 2022-03-31
Payer: MEDICARE

## 2022-03-31 VITALS
OXYGEN SATURATION: 98 % | HEIGHT: 64 IN | TEMPERATURE: 97.3 F | HEART RATE: 78 BPM | BODY MASS INDEX: 24.07 KG/M2 | RESPIRATION RATE: 16 BRPM | WEIGHT: 141 LBS

## 2022-03-31 DIAGNOSIS — M53.3 PAIN OF RIGHT SACROILIAC JOINT: ICD-10-CM

## 2022-03-31 DIAGNOSIS — M47.816 LUMBAR FACET ARTHROPATHY: Primary | ICD-10-CM

## 2022-03-31 DIAGNOSIS — M62.838 MUSCLE SPASM: ICD-10-CM

## 2022-03-31 DIAGNOSIS — M79.10 TRIGGER POINT: ICD-10-CM

## 2022-03-31 DIAGNOSIS — M48.062 SPINAL STENOSIS OF LUMBAR REGION WITH NEUROGENIC CLAUDICATION: ICD-10-CM

## 2022-03-31 DIAGNOSIS — R26.9 GAIT ABNORMALITY: ICD-10-CM

## 2022-03-31 PROCEDURE — G8420 CALC BMI NORM PARAMETERS: HCPCS | Performed by: PHYSICAL MEDICINE & REHABILITATION

## 2022-03-31 PROCEDURE — G8400 PT W/DXA NO RESULTS DOC: HCPCS | Performed by: PHYSICAL MEDICINE & REHABILITATION

## 2022-03-31 PROCEDURE — G8432 DEP SCR NOT DOC, RNG: HCPCS | Performed by: PHYSICAL MEDICINE & REHABILITATION

## 2022-03-31 PROCEDURE — 1090F PRES/ABSN URINE INCON ASSESS: CPT | Performed by: PHYSICAL MEDICINE & REHABILITATION

## 2022-03-31 PROCEDURE — 1101F PT FALLS ASSESS-DOCD LE1/YR: CPT | Performed by: PHYSICAL MEDICINE & REHABILITATION

## 2022-03-31 PROCEDURE — 99213 OFFICE O/P EST LOW 20 MIN: CPT | Performed by: PHYSICAL MEDICINE & REHABILITATION

## 2022-03-31 PROCEDURE — G8427 DOCREV CUR MEDS BY ELIG CLIN: HCPCS | Performed by: PHYSICAL MEDICINE & REHABILITATION

## 2022-03-31 PROCEDURE — G8536 NO DOC ELDER MAL SCRN: HCPCS | Performed by: PHYSICAL MEDICINE & REHABILITATION

## 2022-03-31 RX ORDER — PREDNISONE 5 MG/1
TABLET ORAL
Qty: 30 TABLET | Refills: 0 | Status: SHIPPED | OUTPATIENT
Start: 2022-03-31

## 2022-03-31 NOTE — PATIENT INSTRUCTIONS
Low Back Arthritis: Exercises  Introduction  Here are some examples of typical rehabilitation exercises for your condition. Start each exercise slowly. Ease off the exercise if you start to have pain. Your doctor or physical therapist will tell you when you can start these exercises and which ones will work best for you. When you are not being active, find a comfortable position for rest. Some people are comfortable on the floor or a medium-firm bed with a small pillow under their head and another under their knees. Some people prefer to lie on their side with a pillow between their knees. Don't stay in one position for too long. Take short walks (10 to 20 minutes) every 2 to 3 hours. Avoid slopes, hills, and stairs until you feel better. Walk only distances you can manage without pain, especially leg pain. How to do the exercises  Pelvic tilt    1. Lie on your back with your knees bent. 2. \"Brace\" your stomachtighten your muscles by pulling in and imagining your belly button moving toward your spine. 3. Press your lower back into the floor. You should feel your hips and pelvis rock back. 4. Hold for 6 seconds while breathing smoothly. 5. Relax and allow your pelvis and hips to rock forward. 6. Repeat 8 to 12 times. Back stretches    1. Get down on your hands and knees on the floor. 2. Relax your head and allow it to droop. Round your back up toward the ceiling until you feel a nice stretch in your upper, middle, and lower back. Hold this stretch for as long as it feels comfortable, or about 15 to 30 seconds. 3. Return to the starting position with a flat back while you are on your hands and knees. 4. Let your back sway by pressing your stomach toward the floor. Lift your buttocks toward the ceiling. 5. Hold this position for 15 to 30 seconds. 6. Repeat 2 to 4 times. Follow-up care is a key part of your treatment and safety.  Be sure to make and go to all appointments, and call your doctor if you are having problems. It's also a good idea to know your test results and keep a list of the medicines you take. Where can you learn more? Go to http://www.[employer].com/  Enter T094 in the search box to learn more about \"Low Back Arthritis: Exercises. \"  Current as of: July 1, 2021               Content Version: 13.2  © 2006-2022 [employer]. Care instructions adapted under license by [employer] (which disclaims liability or warranty for this information). If you have questions about a medical condition or this instruction, always ask your healthcare professional. [place] [address] any warranty or liability for your use of this information. Neck Arthritis: Exercises  Introduction  Here are some examples of exercises for you to try. The exercises may be suggested for a condition or for rehabilitation. Start each exercise slowly. Ease off the exercises if you start to have pain. You will be told when to start these exercises and which ones will work best for you. How to do the exercises  Neck stretches to the side    1. This stretch works best if you keep your shoulder down as you lean away from it. To help you remember to do this, start by relaxing your shoulders and lightly holding on to your thighs or your chair. 2. Tilt your head toward your shoulder and hold for 15 to 30 seconds. Let the weight of your head stretch your muscles. 3. Repeat 2 to 4 times toward each shoulder. Chin tuck    1. Lie on the floor with a rolled-up towel under your neck. Your head should be touching the floor. 2. Slowly bring your chin toward your chest.  3. Hold for a count of 6, and then relax for up to 10 seconds. 4. Repeat 8 to 12 times. Active cervical rotation    1. Sit in a firm chair, or stand up straight. 2. Keeping your chin level, turn your head to the right, and hold for 15 to 30 seconds.   3. Turn your head to the left and hold for 15 to 30 seconds. 4. Repeat 2 to 4 times to each side. Shoulder blade squeeze    1. While standing, squeeze your shoulder blades together. 2. Do not raise your shoulders up as you are squeezing. 3. Hold for 6 seconds. 4. Repeat 8 to 12 times. Shoulder rolls    1. Sit comfortably with your feet shoulder-width apart. You can also do this exercise standing up. 2. Roll your shoulders up, then back, and then down in a smooth, circular motion. 3. Repeat 2 to 4 times. Follow-up care is a key part of your treatment and safety. Be sure to make and go to all appointments, and call your doctor if you are having problems. It's also a good idea to know your test results and keep a list of the medicines you take. Where can you learn more? Go to http://www.gray.com/  Enter D146 in the search box to learn more about \"Neck Arthritis: Exercises. \"  Current as of: July 1, 2021               Content Version: 13.2  © 2006-2022 [employer]. Care instructions adapted under license by [employer] (which disclaims liability or warranty for this information). If you have questions about a medical condition or this instruction, always ask your healthcare professional. [address] any warranty or liability for your use of this information. Knee Arthritis: Exercises  Introduction  Here are some examples of exercises for you to try. The exercises may be suggested for a condition or for rehabilitation. Start each exercise slowly. Ease off the exercises if you start to have pain. You will be told when to start these exercises and which ones will work best for you. How to do the exercises  Knee flexion with heel slide    1. Lie on your back with your knees bent. 2. Slide your heel back by bending your affected knee as far as you can. Then hook your other foot around your ankle to help pull your heel even farther back.   3. Hold for about 6 seconds, then rest for up to 10 seconds. 4. Repeat 8 to 12 times. 5. Switch legs and repeat steps 1 through 4, even if only one knee is sore. Quad sets    1. Sit with your affected leg straight and supported on the floor or a firm bed. Place a small, rolled-up towel under your knee. Your other leg should be bent, with that foot flat on the floor. 2. Tighten the thigh muscles of your affected leg by pressing the back of your knee down into the towel. 3. Hold for about 6 seconds, then rest for up to 10 seconds. 4. Repeat 8 to 12 times. 5. Switch legs and repeat steps 1 through 4, even if only one knee is sore. Straight-leg raises to the front    1. Lie on your back with your good knee bent so that your foot rests flat on the floor. Your affected leg should be straight. Make sure that your low back has a normal curve. You should be able to slip your hand in between the floor and the small of your back, with your palm touching the floor and your back touching the back of your hand. 2. Tighten the thigh muscles in your affected leg by pressing the back of your knee flat down to the floor. Hold your knee straight. 3. Keeping the thigh muscles tight and your leg straight, lift your affected leg up so that your heel is about 12 inches off the floor. Hold for about 6 seconds, then lower slowly. 4. Relax for up to 10 seconds between repetitions. 5. Repeat 8 to 12 times. 6. Switch legs and repeat steps 1 through 5, even if only one knee is sore. Active knee flexion    1. Lie on your stomach with your knees straight. If your kneecap is uncomfortable, roll up a washcloth and put it under your leg just above your kneecap. 2. Lift the foot of your affected leg by bending the knee so that you bring the foot up toward your buttock. If this motion hurts, try it without bending your knee quite as far. This may help you avoid any painful motion. 3. Slowly move your leg up and down. 4. Repeat 8 to 12 times.   5. Switch legs and repeat steps 1 through 4, even if only one knee is sore. Quadriceps stretch (facedown)    1. Lie flat on your stomach, and rest your face on the floor. 2. Wrap a towel or belt strap around the lower part of your affected leg. Then use the towel or belt strap to slowly pull your heel toward your buttock until you feel a stretch. 3. Hold for about 15 to 30 seconds, then relax your leg against the towel or belt strap. 4. Repeat 2 to 4 times. 5. Switch legs and repeat steps 1 through 4, even if only one knee is sore. Stationary exercise bike    1. If you do not have a stationary exercise bike at home, you can find one to ride at your local health club or community center. 2. Adjust the height of the bike seat so that your knee is slightly bent when your leg is extended downward. If your knee hurts when the pedal reaches the top, you can raise the seat so that your knee does not bend as much. 3. Start slowly. At first, try to do 5 to 10 minutes of cycling with little to no resistance. Then increase your time and the resistance bit by bit until you can do 20 to 30 minutes without pain. 4. If you start to have pain, rest your knee until your pain gets back to the level that is normal for you. Or cycle for less time or with less effort. Follow-up care is a key part of your treatment and safety. Be sure to make and go to all appointments, and call your doctor if you are having problems. It's also a good idea to know your test results and keep a list of the medicines you take. Where can you learn more? Go to http://www.[employer].com/  Enter C159 in the search box to learn more about \"Knee Arthritis: Exercises. \"  Current as of: July 1, 2021               Content Version: 13.2  © 2006-2022 [employer]. Care instructions adapted under license by [employer] (which disclaims liability or warranty for this information).  If you have questions about a medical condition or this instruction, always ask your healthcare professional. [place] [address] any warranty or liability for your use of this information. Sacroiliac Pain: Exercises  Introduction  Here are some examples of exercises for you to try. The exercises may be suggested for a condition or for rehabilitation. Start each exercise slowly. Ease off the exercises if you start to have pain. You will be told when to start these exercises and which ones will work best for you. How to do the exercises  Knee-to-chest stretch    1. Do not do the knee-to-chest exercise if it causes or increases back or leg pain. 2. Lie on your back with your knees bent and your feet flat on the floor. You can put a small pillow under your head and neck if it is more comfortable. 3. Grasp your hands under one knee and bring the knee to your chest, keeping the other foot flat on the floor. 4. Keep your lower back pressed to the floor. Hold for at least 15 to 30 seconds. 5. Relax and lower the knee to the starting position. Repeat with the other leg. 6. Repeat 2 to 4 times with each leg. 7. To get more stretch, keep your other leg flat on the floor while pulling your knee to your chest.  Bridging    1. Lie on your back with both knees bent. Your knees should be bent about 90 degrees. 2. Tighten your belly muscles by pulling in your belly button toward your spine. Then push your feet into the floor, squeeze your buttocks, and lift your hips off the floor until your shoulders, hips, and knees are all in a straight line. 3. Hold for about 6 seconds as you continue to breathe normally, and then slowly lower your hips back down to the floor and rest for up to 10 seconds. 4. Repeat 8 to 12 times. Hip extension    1. Get down on your hands and knees on the floor. 2. Keeping your back and neck straight, lift one leg straight out behind you. When you lift your leg, keep your hips level.  Don't let your back twist, and don't let your hip drop toward the floor. 3. Hold for 6 seconds. Repeat 8 to 12 times with each leg. 4. If you feel steady and strong when you do this exercise, you can make it more difficult. To do this, when you lift your leg, also lift the opposite arm straight out in front of you. For example, lift the left leg and the right arm at the same time. (This is sometimes called the \"bird dog exercise. \") Hold for 6 seconds, and repeat 8 to 12 times on each side. Clamshell    1. Lie on your side with a pillow under your head. Keep your feet and knees together and your knees bent. 2. Raise your top knee, but keep your feet together. Do not let your hips roll back. Your legs should open up like a clamshell. 3. Hold for 6 seconds. 4. Slowly lower your knee back down. Rest for 10 seconds. 5. Repeat 8 to 12 times. 6. Switch to your other side and repeat steps 1 through 5. Hamstring wall stretch    1. Lie on your back in a doorway, with one leg through the open door. 2. Slide your affected leg up the wall to straighten your knee. You should feel a gentle stretch down the back of your leg. 3. Hold the stretch for at least 1 minute to begin. Then try to lengthen the time you hold the stretch to as long as 6 minutes. 4. Switch legs, and repeat steps 1 through 3.  5. Repeat 2 to 4 times. 6. If you do not have a place to do this exercise in a doorway, there is another way to do it:  7. Lie on your back, and bend one knee. 8. Loop a towel under the ball and toes of that foot, and hold the ends of the towel in your hands. 9. Straighten your knee, and slowly pull back on the towel. You should feel a gentle stretch down the back of your leg. 10. Switch legs, and repeat steps 1 through 3.  11. Repeat 2 to 4 times. 1. Do not arch your back. 2. Do not bend either knee. 3. Keep one heel touching the floor and the other heel touching the wall. Do not point your toes. Lower abdominal strengthening    1.  Lie on your back with your knees bent and your feet flat on the floor. 2. Tighten your belly muscles by pulling your belly button in toward your spine. 3. Lift one foot off the floor and bring your knee toward your chest, so that your knee is straight above your hip and your leg is bent like the letter \"L. \"  4. Lift the other knee up to the same position. 5. Lower one leg at a time to the starting position. 6. Keep alternating legs until you have lifted each leg 8 to 12 times. 7. Be sure to keep your belly muscles tight and your back still as you are moving your legs. Be sure to breathe normally. Piriformis stretch    1. Lie on your back with your legs straight. 2. Lift your affected leg, and bend your knee. With your opposite hand, reach across your body, and then gently pull your knee toward your opposite shoulder. 3. Hold the stretch for 15 to 30 seconds. 4. Switch legs and repeat steps 1 through 3.  5. Repeat 2 to 4 times. Follow-up care is a key part of your treatment and safety. Be sure to make and go to all appointments, and call your doctor if you are having problems. It's also a good idea to know your test results and keep a list of the medicines you take. Where can you learn more? Go to http://www.gray.com/  Enter V765 in the search box to learn more about \"Sacroiliac Pain: Exercises. \"  Current as of: July 1, 2021               Content Version: 13.2  © 2006-2022 [employer]. Care instructions adapted under license by [employer] (which disclaims liability or warranty for this information). If you have questions about a medical condition or this instruction, always ask your healthcare professional. [place] [address] any warranty or liability for your use of this information.

## 2022-03-31 NOTE — PROGRESS NOTES
[facility]  [address] [place] [address] [place]   Phone: [phone]  Fax: [phone]    Pt's YOB: 1940    ASSESSMENT   Diagnoses and all orders for this visit:    1. Lumbar facet arthropathy  -     predniSONE (DELTASONE) 5 mg tablet; Take 1 tab by mouth daily with food as directed. 2. Spinal stenosis of lumbar region with neurogenic claudication    3. Trigger point    4. Pain of right sacroiliac joint  -     predniSONE (DELTASONE) 5 mg tablet; Take 1 tab by mouth daily with food as directed. 5. Muscle spasm    6. Gait abnormality         IMPRESSION AND PLAN:  Marci Loera is a 79 y.o. female with history of cervical and lower lumbar pain. She complains of left lumbar pain radiating down the left leg to the knee x 2 days, bilateral knee pain x 1 day, and continued right leg weakness and occasional right foot numbness and tingling. Pt is taking Neurontin 300 mg 1 cap QHS and aspirin 81 mg daily. 1) Pt was given information on lumbar, cervical, and knee arthritis and sacroiliac exercises. 2) Pt was prescribed prednisone 5 mg 1 tab daily with food for 3 weeks, then decreasing to 0.5 tab daily for 2 weeks. 3) I recommended the pt increase her activity as her symptoms allow, modulate her activity on less severe pain days to avoid pain the next day, and change positions frequently. 4) I recommended moist heat and stretching for pt's upper trapezius tightness. Dry needling and trigger point injections will be considered at her next office visit. 5) Pt was given information on how to use a TheraCane. 6) Pt was counseled on proper lifting mechanics. 7) I recommended the pt ambulate with the assistance of a cane on the left to offload her right leg due to weakness. 8) Ms. Whit Saunders has a reminder for a \"due or due soon\" health maintenance.  I have asked that she contact her primary care provider, Raad Woodward MD, for follow-up on this health maintenance. 9)  demonstrated consistency with prescribing. Follow-up and Dispositions    · Return in about 6 weeks (around 5/12/2022) for Medication follow up. HISTORY OF PRESENT ILLNESS:  Alvin Perdomo is a 79 y.o. female with history of cervical and lower lumbar pain and presents to the office today for medication follow up, accompanied by her son. Pt complains of left lumbar pain radiating down the left leg to the knee x 2 days, bilateral knee pain x 1 day, and continued right leg weakness and occasional right foot numbness and tingling. She notes improvement in her pain and weakness since her last office visit. Pt notes that her pain is exacerbated by the colder weather. She states that she ambulates with the assistance of a cane sometimes at home. Pt's son also confirms that pt's ambulation has improved. Pt also reports that she has purchased and uses a seated pedal exerciser. She further notes a HEP of stretching before rising from bed and walking. Pt is taking Neurontin 300 mg 1 cap QHS and aspirin 81 mg daily. She reports that she has finished the course of prednisone prescribed at her last office visit with relief. She confirms a prior case of pancreatitis. Pt at this time desires to proceed with medication evaluation.     Pain Scale: 6/10    PCP: Flower Schneider MD     Past Medical History:   Diagnosis Date    AAA (abdominal aortic aneurysm) ([facility] [address]     Adrenal cyst (HCC)     Arthritis     Asthma     Degenerative joint disease (DJD) of lumbar spine     DJD (degenerative joint disease) of cervical spine     BORIS (generalized anxiety disorder)     GERD (gastroesophageal reflux disease)     HLD (hyperlipidemia)     Hypertension     Kidney lesion, native, left     Leaking abdominal aortic aneurysm (AAA) (HCC)     Liver cyst     Lower back pain     Meniere disease, left     Pancreatic cyst     Renal cyst     Solitary pulmonary nodule         Social History Socioeconomic History    Marital status:      Spouse name: Not on file    Number of children: Not on file    Years of education: Not on file    Highest education level: Not on file   Occupational History    Not on file   Tobacco Use    Smoking status: Former Smoker     Quit date:      Years since quittin.2    Smokeless tobacco: Never Used   Substance and Sexual Activity    Alcohol use: No    Drug use: No    Sexual activity: Not on file   Other Topics Concern    Not on file   Social History Narrative    Not on file     Social Determinants of Health     Financial Resource Strain:     Difficulty of Paying Living Expenses: Not on file   Food Insecurity:     Worried About [address] in the Last Year: Not on file    [address] in the Last Year: Not on file   Transportation Needs:     Lack of Transportation (Medical): Not on file    Lack of Transportation (Non-Medical):  Not on file   Physical Activity:     Days of Exercise per Week: Not on file    Minutes of Exercise per Session: Not on file   Stress:     Feeling of Stress : Not on file   Social Connections:     Frequency of Communication with Friends and Family: Not on file    Frequency of Social Gatherings with Friends and Family: Not on file    Attends [employer] Services: Not on file    Active Member of [address] [place] [address] [employer] or Organizations: Not on file    Attends Club or Organization Meetings: Not on file    Marital Status: Not on file   Intimate Partner Violence:     Fear of Current or Ex-Partner: Not on file    Emotionally Abused: Not on file    Physically Abused: Not on file    Sexually Abused: Not on file   Housing Stability:     Unable to Pay for Housing in the Last Year: Not on file    Number of [place] in the Last Year: Not on file    Unstable Housing in the Last Year: Not on file       Current Outpatient Medications   Medication Sig Dispense Refill    predniSONE (DELTASONE) 5 mg tablet Take 1 tab by mouth daily with food as directed. 30 Tablet 0    lidocaine (LIDODERM) 5 % 1 Patch by TransDERmal route every twenty-four (24) hours. Apply patch to the affected area for 12 hours a day and remove for 12 hours a day. 30 Each 1    gabapentin (NEURONTIN) 300 mg capsule Take 1 Cap by mouth nightly. Max Daily Amount: 300 mg. 90 Cap 1    folic acid/multivit-min/lutein (CENTRUM SILVER PO) Take 1 Tab by Mouth Once a Day.  loratadine (CLARITIN) 10 mg tablet 10 mg.      aluminum & magnesium hydroxide-simethicone (MYLANTA II) 400-400-40 mg/5 mL suspension 30 mL.  omeprazole (PRILOSEC) 20 mg capsule 20 mg.      umeclidinium-vilanterol (ANORO ELLIPTA) 62.5-25 mcg/actuation inhaler Take 1 Puff by inhalation daily.  acetaminophen (TYLENOL) 500 mg tablet 1,000 mg.  calcium-vitamin D 600 mg(1,500mg) -200 unit tab Take 1 Tab by Mouth Twice Daily.  fluticasone (FLONASE) 50 mcg/actuation nasal spray 2 Sprays.  carvedilol (COREG) 12.5 mg tablet Take  by mouth two (2) times daily (with meals).  furosemide (LASIX) 20 mg tablet Take  by mouth daily.  losartan (COZAAR) 50 mg tablet Take  by mouth daily.  aspirin 81 mg tablet Take 81 mg by mouth.  predniSONE (DELTASONE) 5 mg tablet 2 tabs in the morning with food x 15 days then 1 po daily (Patient not taking: Reported on 3/31/2022) 45 Tablet 0    methylPREDNISolone (MEDROL DOSEPACK) 4 mg tablet Per dose pack instructions (Patient not taking: Reported on 3/31/2022) 1 Dose Pack 0    tamsulosin (FLOMAX) 0.4 mg capsule Take 1 Cap by mouth daily (after dinner). (Patient not taking: Reported on 3/31/2022) 30 Cap 12    nitroglycerin (NITROSTAT) 0.4 mg SL tablet 0.4 mg. (Patient not taking: Reported on 3/31/2022)      lovastatin (MEVACOR) 40 mg tablet Take 40 mg by mouth nightly. (Patient not taking: Reported on 3/31/2022)      timolol (TIMOPTIC) 0.5 % ophthalmic solution 1 Drop two (2) times a day.  (Patient not taking: Reported on 3/31/2022)         Allergies   Allergen Reactions    Morphine Itching and Hives         REVIEW OF SYSTEMS    Constitutional: Negative for fever, chills, or weight change. Respiratory: Negative for cough or shortness of breath. Cardiovascular: Negative for chest pain or palpitations. Gastrointestinal: Negative for acid reflux, change in bowel habits, or constipation. Genitourinary: Negative for dysuria and flank pain. Musculoskeletal: Positive for lumbar, bilateral leg, and bilateral knee pain. Positive for right leg weakness. Skin: Negative for rash. Neurological: Negative for headaches or dizziness. Positive for right foot numbness and tingling. Endo/Heme/Allergies: Negative for increased bruising. Psychiatric/Behavioral: Negative for difficulty with sleep. As per HPI    PHYSICAL EXAMINATION  Visit Vitals  Pulse 78   Temp 97.3 °F (36.3 °C) (Temporal)   Resp 16   Ht 5' 4\" (1.626 m)   Wt 141 lb (64 kg)   SpO2 98%   BMI 24.20 kg/m²       Constitutional: Awake, alert, and in no acute distress. Neurological: Sensation to light touch is intact. Skin: warm, dry, and intact. Musculoskeletal: Tightness across the upper trapezius bilaterally. No pain with extension, axial loading, or forward flexion. No pain with internal or external rotation of her hips. Negative straight leg raise bilaterally. Hip Flex  Quads Hamstrings Ankle DF EHL Ankle PF   Right +4/5 +4/5 +4/5 +4/5 +4/5 +4/5   Left +4/5 +4/5 +4/5 +4/5 +4/5 +4/5     IMAGING:    Lumbar spine MRI from 12/06/2021 was personally reviewed with the patient and demonstrated:     FINDINGS:     Stable 10 mm anterior spondylolisthesis L4 on L5 and slight retrolisthesis L5/S1. Previous left-sided hemilaminotomy L5/S1 and previous laminotomy L4/5. No evidence of fracture or spondylolysis.  No marrow edema or neoplastic marrow signal. Again noted is a low-lying conus at the upper L3 level with an adjacent contiguous oval and slightly lobulated intradural lesion with increased T1 signal and without definite enhancement, measuring maximally 14 mm in cephalocaudad dimension and 8 mm transversely. There is also somewhat thickened appearing filum inferior to this lesion and persistent abnormal peripheral splaying and disc organized appearing cauda equina at the L5 and upper sacral level. There is a complex cystic lesion involving the tail of the pancreas 4 cm in diameter which was described on recent abdomen MRI and not requiring any further follow-up. There are several benign-appearing hepatic cysts and several small benign-appearing bilateral renal cysts. Mild degenerative spondylosis without significant stenosis lower thoracic spine. Visualized upper sacrum unremarkable. There is 6 cm diameter infrarenal abdominal aortic aneurysm of the native aorta in this patient with previous stent graft, not significant changed in size compared to recent abdominal/pelvic CT.     L1/2 level: Minimal degenerative spondylosis without significant stenosis. L2/3 level: Mild facet arthropathy and minimal degenerative disc changes without significant stenosis. L3/4 level: Stable focal prominent subdural fluid dorsally. There is slight interval increase broad posterior disc protrusion asymmetric to the right with persistent moderate to severe canal stenosis AP diameter canal 6-7 mm very small residual CSF signal surrounding crowded cauda equina. There is severe right lateral recess stenosis with mild left lateral recess stenosis. Stable moderate to severe right-sided and severe left-sided subarticular foraminal stenosis. L4/5 level: Previous hemilaminotomy with severe bilateral facet arthropathy and anterior listhesis with slight progression of moderate canal stenosis with mild to moderate bilateral lateral recess stenosis right greater than left. Persistent moderate to severe left-sided and moderate right-sided subarticular foraminal stenosis.      L5/S1 level: Prior left-sided hemilaminotomy with severe disc space narrowing and degenerative spondylosis with mild canal stenosis. Enhancing postop granulation surrounding proximal left S1 nerve root sleeve. Moderate to severe degenerative right lateral recess stenosis. Very severe degenerative right-sided foraminal stenosis. Contrast: There is no other abnormal enhancement.       _______________     IMPRESSION       1. Slight interval progression of moderate to severe acquired canal stenosis L3/4 with severe right lateral recess stenosis. Moderate to severe bilateral foraminal stenosis left greater than right. 2. Slight progression of moderate acquired canal stenosis L4/5 with prior laminotomy, mild to moderate lateral recess stenosis and moderate to severe foraminal stenosis worse on the left. 3. Prior left-sided hemilaminotomy L5/S1 with postop fibrosis surrounding left S1 nerve root sleeve. Moderate to severe right lateral recess and severe degenerative right foraminal stenosis. Mild canal stenosis. 4. 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     Written by David Trujillo, as dictated by Tonie Patel MD.  I, Dr. Tonie Patel confirm that all documentation is accurate.

## 2022-05-19 ENCOUNTER — TELEPHONE (OUTPATIENT)
Dept: ORTHOPEDIC SURGERY | Age: 82
End: 2022-05-19

## 2022-08-04 ENCOUNTER — OFFICE VISIT (OUTPATIENT)
Dept: ORTHOPEDIC SURGERY | Age: 82
End: 2022-08-04
Payer: MEDICARE

## 2022-08-04 VITALS
WEIGHT: 146.2 LBS | TEMPERATURE: 97 F | BODY MASS INDEX: 24.36 KG/M2 | OXYGEN SATURATION: 99 % | HEART RATE: 73 BPM | HEIGHT: 65 IN

## 2022-08-04 DIAGNOSIS — M47.816 LUMBAR FACET ARTHROPATHY: ICD-10-CM

## 2022-08-04 DIAGNOSIS — M48.062 SPINAL STENOSIS OF LUMBAR REGION WITH NEUROGENIC CLAUDICATION: Primary | ICD-10-CM

## 2022-08-04 DIAGNOSIS — M62.838 MUSCLE SPASM: ICD-10-CM

## 2022-08-04 DIAGNOSIS — Z79.01 CHRONIC ANTICOAGULATION: ICD-10-CM

## 2022-08-04 DIAGNOSIS — M53.3 PAIN OF RIGHT SACROILIAC JOINT: ICD-10-CM

## 2022-08-04 PROCEDURE — 1123F ACP DISCUSS/DSCN MKR DOCD: CPT | Performed by: PHYSICAL MEDICINE & REHABILITATION

## 2022-08-04 PROCEDURE — G8420 CALC BMI NORM PARAMETERS: HCPCS | Performed by: PHYSICAL MEDICINE & REHABILITATION

## 2022-08-04 PROCEDURE — G8536 NO DOC ELDER MAL SCRN: HCPCS | Performed by: PHYSICAL MEDICINE & REHABILITATION

## 2022-08-04 PROCEDURE — 1090F PRES/ABSN URINE INCON ASSESS: CPT | Performed by: PHYSICAL MEDICINE & REHABILITATION

## 2022-08-04 PROCEDURE — G8400 PT W/DXA NO RESULTS DOC: HCPCS | Performed by: PHYSICAL MEDICINE & REHABILITATION

## 2022-08-04 PROCEDURE — G8432 DEP SCR NOT DOC, RNG: HCPCS | Performed by: PHYSICAL MEDICINE & REHABILITATION

## 2022-08-04 PROCEDURE — 99214 OFFICE O/P EST MOD 30 MIN: CPT | Performed by: PHYSICAL MEDICINE & REHABILITATION

## 2022-08-04 PROCEDURE — G8427 DOCREV CUR MEDS BY ELIG CLIN: HCPCS | Performed by: PHYSICAL MEDICINE & REHABILITATION

## 2022-08-04 PROCEDURE — 1101F PT FALLS ASSESS-DOCD LE1/YR: CPT | Performed by: PHYSICAL MEDICINE & REHABILITATION

## 2022-08-04 RX ORDER — ALBUTEROL SULFATE 90 UG/1
AEROSOL, METERED RESPIRATORY (INHALATION)
COMMUNITY
Start: 2022-06-13

## 2022-08-04 RX ORDER — CLOPIDOGREL BISULFATE 75 MG/1
TABLET ORAL
COMMUNITY
Start: 2022-06-02

## 2022-08-04 NOTE — PROGRESS NOTES
[facility]  [address] [place], [address]  Phone: [phone]  Fax: [phone]    Pt's YOB: 1940    ASSESSMENT   Diagnoses and all orders for this visit:    1. Spinal stenosis of lumbar region with neurogenic claudication    2. Lumbar facet arthropathy    3. Pain of right sacroiliac joint    4. Muscle spasm    5. Chronic anticoagulation       IMPRESSION AND PLAN:  Pt is 79 you female with hx of lumbar spinal stenosis, pain and neurogenic claudication; she has improved with HEP and continues to drive on a limited basis. 1) Pt was given information on lumbar spinal stenosis. 2) Multiple options including medication, injections, and indications for surgery. 3) Pt will continue with current medications including gabapentin, tylenol and lidocaine patches  4) Ms. Chase Quinonez has a reminder for a \"due or due soon\" health maintenance. I have asked that she contact her primary care provider, Edward Ramirez MD, for follow-up on this health maintenance. 5)  demonstrated consistency with prescribing. 6) Pt is not a candidate for NSAIDs due to use of Plavix  Follow-up and Dispositions    Return in about 4 months (around 12/4/2022) for Medication follow up. HISTORY OF PRESENT ILLNESS:  Venita Rowley is a 79 y.o.  female with history of lumbar pain and spinal stenosis and presents to the office today for  follow up. She rates her pain as a 3/10 and describes it as dull. She states she has been consistently doing HEP and this has helped [place] lot\". She is able to walk to her mail box which is about 100 ft and has been performing leg exercises. She does continue to live alone but her family checks on her regularly. Life alert or similar systems reviewed  and discussed and she will consider this. Pt is in the office today with her daughter. The pt does continue to drive although on a more limited basis.   Pt remains on Plavix and gabapentin. She continues to use tylenol as needed for pain. Pt would like to continue with current medications and may consider other interventions including injections as symptoms warrant. More than 30 minutes was spent with pt extensively discussing her symptoms, HEP, lumbar spinal stenosis, injections and indications for surgery and current treatment plan.     Pain Scale: 3/10    PCP: Tigre Luna MD     Past Medical History:   Diagnosis Date    AAA (abdominal aortic aneurysm) (HCC)     Adrenal cyst (HCC)     Arthritis     Asthma     Degenerative joint disease (DJD) of lumbar spine     DJD (degenerative joint disease) of cervical spine     BORIS (generalized anxiety disorder)     GERD (gastroesophageal reflux disease)     HLD (hyperlipidemia)     Hypertension     Kidney lesion, native, left     Leaking abdominal aortic aneurysm (AAA) (HCC)     Liver cyst     Lower back pain     Meniere disease, left     Pancreatic cyst     Renal cyst     Solitary pulmonary nodule         Social History     Socioeconomic History    Marital status:      Spouse name: Not on file    Number of children: Not on file    Years of education: Not on file    Highest education level: Not on file   Occupational History    Not on file   Tobacco Use    Smoking status: Former     Types: Cigarettes     Quit date:      Years since quittin.7    Smokeless tobacco: Never   Substance and Sexual Activity    Alcohol use: No    Drug use: No    Sexual activity: Not on file   Other Topics Concern    Not on file   Social History Narrative    Not on file     Social Determinants of Health     Financial Resource Strain: Not on file   Food Insecurity: Not on file   Transportation Needs: Not on file   Physical Activity: Not on file   Stress: Not on file   Social Connections: Not on file   Intimate Partner Violence: Not on file   Housing Stability: Not on file       Current Outpatient Medications   Medication Sig Dispense Refill    albuterol (PROVENTIL HFA, VENTOLIN HFA, PROAIR HFA) 90 mcg/actuation inhaler inhale 2 puffs by mouth every 4 hours if needed      clopidogreL (PLAVIX) 75 mg tab       lidocaine (LIDODERM) 5 % 1 Patch by TransDERmal route every twenty-four (24) hours. Apply patch to the affected area for 12 hours a day and remove for 12 hours a day. 30 Each 1    gabapentin (NEURONTIN) 300 mg capsule Take 1 Cap by mouth nightly. Max Daily Amount: 300 mg. 90 Cap 1    folic acid/multivit-min/lutein (CENTRUM SILVER PO) Take 1 Tab by Mouth Once a Day. loratadine (CLARITIN) 10 mg tablet 10 mg.      omeprazole (PRILOSEC) 20 mg capsule 20 mg.      umeclidinium-vilanteroL (ANORO ELLIPTA) 62.5-25 mcg/actuation inhaler Take 1 Puff by inhalation daily. acetaminophen (TYLENOL) 500 mg tablet 1,000 mg.      calcium-vitamin D 600 mg(1,500mg) -200 unit tab Take 1 Tab by Mouth Twice Daily. fluticasone (FLONASE) 50 mcg/actuation nasal spray 2 Sprays. carvediloL (COREG) 12.5 mg tablet Take  by mouth two (2) times daily (with meals). furosemide (LASIX) 20 mg tablet Take  by mouth daily. losartan (COZAAR) 50 mg tablet Take  by mouth daily. timolol (TIMOPTIC) 0.5 % ophthalmic solution 1 Drop two (2) times a day. aspirin 81 mg tablet Take 81 mg by mouth.      predniSONE (DELTASONE) 5 mg tablet Take 1 tab by mouth daily with food as directed. 30 Tablet 0    predniSONE (DELTASONE) 5 mg tablet 2 tabs in the morning with food x 15 days then 1 po daily 45 Tablet 0    methylPREDNISolone (MEDROL DOSEPACK) 4 mg tablet Per dose pack instructions 1 Dose Pack 0    tamsulosin (FLOMAX) 0.4 mg capsule Take 1 Cap by mouth daily (after dinner). 30 Cap 12    aluminum & magnesium hydroxide-simethicone (MYLANTA II) 400-400-40 mg/5 mL suspension 30 mL. (Patient not taking: Reported on 8/4/2022)      nitroglycerin (NITROSTAT) 0.4 mg SL tablet 0.4 mg.      lovastatin (MEVACOR) 40 mg tablet Take 40 mg by mouth nightly. Allergies   Allergen Reactions    Morphine Itching and Hives         REVIEW OF SYSTEMS    Constitutional: Negative for fever, chills, or weight change. Respiratory: Negative for cough or shortness of breath. Cardiovascular: Negative for chest pain or palpitations. Gastrointestinal: Negative for acid reflux, change in bowel habits, or constipation. Genitourinary: Negative for dysuria and flank pain. Musculoskeletal: Positive for lumbar pain. Skin: Negative for rash. Neurological: Negative for headaches, dizziness, or numbness. Endo/Heme/Allergies: Negative for increased bruising. Psychiatric/Behavioral: Negative for difficulty with sleep. As per HPI    PHYSICAL EXAMINATION  Visit Vitals  Pulse 73   Temp 97 °F (36.1 °C) (Skin)   Ht 5' 5\" (1.651 m)   Wt 146 lb 3.2 oz (66.3 kg)   SpO2 99%   BMI 24.33 kg/m²       Constitutional: Awake, alert, and in no acute distress. Neurological: 1+ symmetrical DTRs in the upper extremities. 1+ symmetrical DTRs in the lower extremities. Sensation to light touch is intact. Negative Lopez's sign bilaterally. Skin: warm, dry, and intact. Musculoskeletal:  No pain with extension, axial loading, or forward flexion. No pain with internal or external rotation of her hips. Negative straight leg raise bilaterally. Biceps  Triceps Deltoids Wrist Ext Wrist Flex Hand Intrin   Right +4/5 +4/5 +4/5 +4/5 +4/5 +4/5   Left +4/5 +4/5 +4/5 +4/5 +4/5 +4/5      Hip Flex  Quads Hamstrings Ankle DF EHL Ankle PF   Right  4/5  4/5  4/5  4/5  4/5  4/5   Left  4/5  4/5  4/5  4/5  4/5  4/5        IMAGING:     Lumbar spine MRI from 12/06/2021 was personally reviewed with the patient and demonstrated:     FINDINGS:     Stable 10 mm anterior spondylolisthesis L4 on L5 and slight retrolisthesis L5/S1. Previous left-sided hemilaminotomy L5/S1 and previous laminotomy L4/5. No evidence of fracture or spondylolysis.  No marrow edema or neoplastic marrow signal. Again noted is a low-lying conus at the upper L3 level with an adjacent contiguous oval and slightly lobulated intradural lesion with increased T1 signal and without definite enhancement, measuring maximally 14 mm in cephalocaudad dimension and 8 mm transversely. There is also somewhat thickened appearing filum inferior to this lesion and persistent abnormal peripheral splaying and disc organized appearing cauda equina at the L5 and upper sacral level. There is a complex cystic lesion involving the tail of the pancreas 4 cm in diameter which was described on recent abdomen MRI and not requiring any further follow-up. There are several benign-appearing hepatic cysts and several small benign-appearing bilateral renal cysts. Mild degenerative spondylosis without significant stenosis lower thoracic spine. Visualized upper sacrum unremarkable. There is 6 cm diameter infrarenal abdominal aortic aneurysm of the native aorta in this patient with previous stent graft, not significant changed in size compared to recent abdominal/pelvic CT.     L1/2 level: Minimal degenerative spondylosis without significant stenosis. L2/3 level: Mild facet arthropathy and minimal degenerative disc changes without significant stenosis. L3/4 level: Stable focal prominent subdural fluid dorsally. There is slight interval increase broad posterior disc protrusion asymmetric to the right with persistent moderate to severe canal stenosis AP diameter canal 6-7 mm very small residual CSF signal surrounding crowded cauda equina. There is severe right lateral recess stenosis with mild left lateral recess stenosis. Stable moderate to severe right-sided and severe left-sided subarticular foraminal stenosis. L4/5 level: Previous hemilaminotomy with severe bilateral facet arthropathy and anterior listhesis with slight progression of moderate canal stenosis with mild to moderate bilateral lateral recess stenosis right greater than left.  Persistent moderate to severe left-sided and moderate right-sided subarticular foraminal stenosis. L5/S1 level: Prior left-sided hemilaminotomy with severe disc space narrowing and degenerative spondylosis with mild canal stenosis. Enhancing postop granulation surrounding proximal left S1 nerve root sleeve. Moderate to severe degenerative right lateral recess stenosis. Very severe degenerative right-sided foraminal stenosis. Contrast: There is no other abnormal enhancement.       _______________     IMPRESSION       1. Slight interval progression of moderate to severe acquired canal stenosis L3/4 with severe right lateral recess stenosis. Moderate to severe bilateral foraminal stenosis left greater than right. 2. Slight progression of moderate acquired canal stenosis L4/5 with prior laminotomy, mild to moderate lateral recess stenosis and moderate to severe foraminal stenosis worse on the left. 3. Prior left-sided hemilaminotomy L5/S1 with postop fibrosis surrounding left S1 nerve root sleeve. Moderate to severe right lateral recess and severe degenerative right foraminal stenosis. Mild canal stenosis. 4. 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

## 2022-12-08 ENCOUNTER — TELEPHONE (OUTPATIENT)
Dept: ORTHOPEDIC SURGERY | Age: 82
End: 2022-12-08

## 2024-02-21 NOTE — PROGRESS NOTES
PT DAILY TREATMENT NOTE - [facility]     Patient Name: Ce Leonard  Date:2017  : 1940  [x]  Patient  Verified  Payor: [place] MED[place] / Plan: [place] MEDICARE PART A & B / Product Type: Medicare /    In time: 9:27  Out time:10:20  Total Treatment Time (min): 53  Total Timed Codes (min): 43  1:1 Treatment Time (MC only): 43   Visit #: 1 of 6    Treatment Area: Unsteadiness on feet [R26.81]  Dizziness and giddiness [R42]    SUBJECTIVE  Pain Level (0-10 scale): D-1, N-0  Any medication changes, allergies to medications, adverse drug reactions, diagnosis change, or new procedure performed?: [x] No    [] Yes (see summary sheet for update)  Subjective functional status/changes:   [] No changes reported  Pt reports very little dizziness today and not really any neck pain.      OBJECTIVE    Modality rationale: decrease pain and increase tissue extensibility to improve the patients ability to perform ADL's   Min Type Additional Details    [] Estim:  []Unatt       []IFC  []Premod                        []Other:  []w/ice   []w/heat  Position:  Location:    [] Estim: []Att    []TENS instruct  []NMES                    []Other:  []w/US   []w/ice   []w/heat  Position:  Location:    []  Traction: [] Cervical       []Lumbar                       [] Prone          []Supine                       []Intermittent   []Continuous Lbs:  [] before manual  [] after manual    []  Ultrasound: []Continuous   [] Pulsed                           []1MHz   []3MHz W/cm2:  Location:    []  Iontophoresis with dexamethasone         Location: [] Take home patch   [] In clinic   10 []  Ice     [x]  heat  []  Ice massage  []  Laser   []  Anodyne Position: supine  Location: neck    []  Laser with stim  []  Other:  Position:  Location:    []  Vasopneumatic Device Pressure:       [] lo [] med [] hi   Temperature: [] lo [] med [] hi   [] Skin assessment post-treatment:  []intact []redness- no adverse reaction    []redness  adverse reaction:      min [id number]     UNIT NUMBER [id number]     DISPENSE STATUS Transfused     PRODUCT ID Red Blood Cells     PRODUCT CODE [id number]     PRODUCT DESCRIPTION RBC [id number]     UNIT NUMBER [id number]     [id number]     UNIT NUMBER [id number]     DISPENSE STATUS Work In Progress     PRODUCT ID Red Blood Cells     PRODUCT CODE [id number]     PRODUCT DESCRIPTION RBC AS-1 LR    Prepare Red Blood  []Eval                  []Re-Eval        min Therapeutic Exercise:  [] See flow sheet :   Rationale:  to improve the patients ability to      min Therapeutic Activity:  []  See flow sheet :   Rationale:   to improve the patients ability to      23 min Neuromuscular Re-education:  [x]  See flow sheet : vestibular and balance ex's   Rationale: increase strength, improve coordination, improve balance and increase proprioception  to improve the patients ability to decrease risk for falls    10 min Manual Therapy:  Per flow sheet   Rationale: decrease pain, increase ROM, increase tissue extensibility and decrease trigger points to increase ease with ADL's    10 min Gait Training:  Dynamic gait per flow sheet   Rationale: to improve (I) with community ambulation          With   [] TE   [] TA   [] neuro   [] other: Patient Education: [x] Review HEP    [] Progressed/Changed HEP based on:   [] positioning   [] body mechanics   [] transfers   [] heat/ice application    [] other:      Other Objective/Functional Measures: Tandem stance B 18 sec w/o LOB     Pain Level (0-10 scale) post treatment: 0    ASSESSMENT/Changes in Function: Put on hold pending symptoms/    Patient will continue to benefit from skilled PT services to modify and progress therapeutic interventions, address functional mobility deficits, address ROM deficits, analyze and address soft tissue restrictions, analyze and cue movement patterns, address imbalance/dizziness and instruct in home and community integration to attain remaining goals. []  See Plan of Care  []  See progress note/recertification  []  See Discharge Summary         Progress towards goals / Updated goals:  Long term goals: to be accomplished in 6 weeks  1) Pt will improve C/S AROM all planes WFL w/o an increase in pain/pull for ease with ADL's and driving.   At PN: Progressing, flex 28 (+8), ext 38 (+3), R SB 18 slight pain (no change), L SB 19 (+1) with slight pain, R rot 58 (+8) with [id number]     PRODUCT DESCRIPTION RBC [id number]     ISSUE DATE/TIME [id number]     PRODUCT DESCRIPTION RBC AS-Mono, Percent 3 %    Eosinophils,  pain, L rot 50 (no change). 12/28/16    Updated Goals to be accomplished in 3 weeks: Continue with above unmet goals  1) Pt will demonstrate B tandem stance > or = 30 sec w/o LOB to decrease risk for falls. At PN: LOB after ~10 sec B (1/3/17)  Current: Progressing- B tandem 18 sec w/o LOB (1/5/17)  2) Pt will report > or = 75% improvement in symptoms in order to progress rehab. At PN: pt reports 60% improvement (1/3/17)      PLAN  []  Upgrade activities as tolerated     []  Continue plan of care  []  Update interventions per flow sheet       []  Discharge due to:_  [x]  Other: Put on hold pending symptoms w/o PT.       Berto Gonzales, PT 1/5/2017  9:43 AM    No future appointments. Axis (Degrees) 56    R Axis (Degrees) 18    T Axis (WALLY FRY MD.    Electronically Signed by: MEAGHAN NO M.D.   Signed on: 2/17/2024 11:09 AM   Workstation ID: [id number]         ASSESSMENT & PLAN     Josh Garcia is a 63 

## (undated) DEVICE — CATHETER SUCT TR FL TIP 14FR W/ O CTRL

## (undated) DEVICE — SYR 50ML SLIP TIP NSAF LF STRL --

## (undated) DEVICE — BANDAGE ADH W0.75XL3IN UNIV WVN FAB NAT GEN USE STRP N ADH

## (undated) DEVICE — FLEX ADVANTAGE 3000CC: Brand: FLEX ADVANTAGE

## (undated) DEVICE — SNARE POLYP M W27MMXL240CM OVL STIFF DISP CAPTIVATOR

## (undated) DEVICE — (D)GLOVE EXAM LG NITRL NS -- DISC BY MFR NO SUB

## (undated) DEVICE — ENDOSCOPY PUMP TUBING/ CAP SET: Brand: ERBE

## (undated) DEVICE — TRAY MYEL SFTY +

## (undated) DEVICE — (D)NDL SPNE 22GX15CM -- DISC BY MFR W/NO SUB

## (undated) DEVICE — (D)BNDG ADHESIVE FABRIC 3/4X3 -- DISC BY MFR USE ITEM 357960

## (undated) DEVICE — MEDI-VAC NON-CONDUCTIVE SUCTION TUBING: Brand: CARDINAL HEALTH

## (undated) DEVICE — FORCEPS BX L240CM JAW DIA2.8MM L CAP W/ NDL MIC MESH TOOTH

## (undated) DEVICE — MEDIA CONTRAST 10ML 200MG/ML 41%

## (undated) DEVICE — SYRINGE MED 20ML STD CLR PLAS LUERLOCK TIP N CTRL DISP

## (undated) DEVICE — GAUZE SPONGES,16 PLY: Brand: CURITY

## (undated) DEVICE — NDL SPNE MANAN 22GX6IN --

## (undated) DEVICE — SOLUTION IRRIG 1000ML H2O STRL BLT

## (undated) DEVICE — SYR 10ML LUER LOK 1/5ML GRAD --

## (undated) DEVICE — FLUFF AND POLYMER UNDERPAD,EXTRA HEAVY: Brand: WINGS

## (undated) DEVICE — CANNULA ORIG TL CLR W FOAM CUSHIONS AND 14FT SUPL TB 3 CHN

## (undated) DEVICE — MEDI-VAC SUCTION HIGH CAPACITY: Brand: CARDINAL HEALTH

## (undated) DEVICE — GOWN ISOL IMPERV UNIV, DISP, OPEN BACK, BLUE --

## (undated) DEVICE — SYRINGE MED 25GA 3ML L5/8IN SUBQ PLAS W/ DETACH NDL SFTY

## (undated) DEVICE — AIRLIFE™ NASAL OXYGEN CANNULA CURVED, NONFLARED TIP WITH 14 FOOT (4.3 M) CRUSH-RESISTANT TUBING, OVER-THE-EAR STYLE: Brand: AIRLIFE™